# Patient Record
Sex: MALE | Race: ASIAN | NOT HISPANIC OR LATINO | URBAN - METROPOLITAN AREA
[De-identification: names, ages, dates, MRNs, and addresses within clinical notes are randomized per-mention and may not be internally consistent; named-entity substitution may affect disease eponyms.]

---

## 2024-01-09 ENCOUNTER — INPATIENT (INPATIENT)
Facility: HOSPITAL | Age: 74
LOS: 2 days | Discharge: HOME | End: 2024-01-12
Attending: INTERNAL MEDICINE | Admitting: INTERNAL MEDICINE
Payer: MEDICARE

## 2024-01-09 VITALS
WEIGHT: 154.98 LBS | DIASTOLIC BLOOD PRESSURE: 91 MMHG | RESPIRATION RATE: 18 BRPM | HEIGHT: 69 IN | TEMPERATURE: 98 F | HEART RATE: 87 BPM | SYSTOLIC BLOOD PRESSURE: 135 MMHG | OXYGEN SATURATION: 99 %

## 2024-01-09 DIAGNOSIS — Z98.890 OTHER SPECIFIED POSTPROCEDURAL STATES: Chronic | ICD-10-CM

## 2024-01-09 LAB
ALBUMIN SERPL ELPH-MCNC: 3.3 G/DL — SIGNIFICANT CHANGE UP (ref 3.3–5)
ALBUMIN SERPL ELPH-MCNC: 3.3 G/DL — SIGNIFICANT CHANGE UP (ref 3.3–5)
ALP SERPL-CCNC: 130 U/L — HIGH (ref 40–120)
ALP SERPL-CCNC: 130 U/L — HIGH (ref 40–120)
ALT FLD-CCNC: 35 U/L — SIGNIFICANT CHANGE UP (ref 12–78)
ALT FLD-CCNC: 35 U/L — SIGNIFICANT CHANGE UP (ref 12–78)
ANION GAP SERPL CALC-SCNC: 5 MMOL/L — SIGNIFICANT CHANGE UP (ref 5–17)
ANION GAP SERPL CALC-SCNC: 5 MMOL/L — SIGNIFICANT CHANGE UP (ref 5–17)
APPEARANCE UR: CLEAR — SIGNIFICANT CHANGE UP
APPEARANCE UR: CLEAR — SIGNIFICANT CHANGE UP
APTT BLD: 32.1 SEC — SIGNIFICANT CHANGE UP (ref 24.5–35.6)
APTT BLD: 32.1 SEC — SIGNIFICANT CHANGE UP (ref 24.5–35.6)
AST SERPL-CCNC: 21 U/L — SIGNIFICANT CHANGE UP (ref 15–37)
AST SERPL-CCNC: 21 U/L — SIGNIFICANT CHANGE UP (ref 15–37)
BACTERIA # UR AUTO: NEGATIVE /HPF — SIGNIFICANT CHANGE UP
BACTERIA # UR AUTO: NEGATIVE /HPF — SIGNIFICANT CHANGE UP
BASOPHILS # BLD AUTO: 0.02 K/UL — SIGNIFICANT CHANGE UP (ref 0–0.2)
BASOPHILS # BLD AUTO: 0.02 K/UL — SIGNIFICANT CHANGE UP (ref 0–0.2)
BASOPHILS NFR BLD AUTO: 0.3 % — SIGNIFICANT CHANGE UP (ref 0–2)
BASOPHILS NFR BLD AUTO: 0.3 % — SIGNIFICANT CHANGE UP (ref 0–2)
BILIRUB SERPL-MCNC: 0.3 MG/DL — SIGNIFICANT CHANGE UP (ref 0.2–1.2)
BILIRUB SERPL-MCNC: 0.3 MG/DL — SIGNIFICANT CHANGE UP (ref 0.2–1.2)
BILIRUB UR-MCNC: NEGATIVE — SIGNIFICANT CHANGE UP
BILIRUB UR-MCNC: NEGATIVE — SIGNIFICANT CHANGE UP
BLD GP AB SCN SERPL QL: SIGNIFICANT CHANGE UP
BLD GP AB SCN SERPL QL: SIGNIFICANT CHANGE UP
BUN SERPL-MCNC: 18 MG/DL — SIGNIFICANT CHANGE UP (ref 7–23)
BUN SERPL-MCNC: 18 MG/DL — SIGNIFICANT CHANGE UP (ref 7–23)
CALCIUM SERPL-MCNC: 8.7 MG/DL — SIGNIFICANT CHANGE UP (ref 8.5–10.1)
CALCIUM SERPL-MCNC: 8.7 MG/DL — SIGNIFICANT CHANGE UP (ref 8.5–10.1)
CHLORIDE SERPL-SCNC: 107 MMOL/L — SIGNIFICANT CHANGE UP (ref 96–108)
CHLORIDE SERPL-SCNC: 107 MMOL/L — SIGNIFICANT CHANGE UP (ref 96–108)
CO2 SERPL-SCNC: 26 MMOL/L — SIGNIFICANT CHANGE UP (ref 22–31)
CO2 SERPL-SCNC: 26 MMOL/L — SIGNIFICANT CHANGE UP (ref 22–31)
COLOR SPEC: YELLOW — SIGNIFICANT CHANGE UP
COLOR SPEC: YELLOW — SIGNIFICANT CHANGE UP
CREAT SERPL-MCNC: 0.69 MG/DL — SIGNIFICANT CHANGE UP (ref 0.5–1.3)
CREAT SERPL-MCNC: 0.69 MG/DL — SIGNIFICANT CHANGE UP (ref 0.5–1.3)
CRP SERPL-MCNC: <3 MG/L — SIGNIFICANT CHANGE UP
CRP SERPL-MCNC: <3 MG/L — SIGNIFICANT CHANGE UP
DIFF PNL FLD: NEGATIVE — SIGNIFICANT CHANGE UP
DIFF PNL FLD: NEGATIVE — SIGNIFICANT CHANGE UP
EGFR: 97 ML/MIN/1.73M2 — SIGNIFICANT CHANGE UP
EGFR: 97 ML/MIN/1.73M2 — SIGNIFICANT CHANGE UP
EOSINOPHIL # BLD AUTO: 0.14 K/UL — SIGNIFICANT CHANGE UP (ref 0–0.5)
EOSINOPHIL # BLD AUTO: 0.14 K/UL — SIGNIFICANT CHANGE UP (ref 0–0.5)
EOSINOPHIL NFR BLD AUTO: 1.8 % — SIGNIFICANT CHANGE UP (ref 0–6)
EOSINOPHIL NFR BLD AUTO: 1.8 % — SIGNIFICANT CHANGE UP (ref 0–6)
EPI CELLS # UR: PRESENT
EPI CELLS # UR: PRESENT
ERYTHROCYTE [SEDIMENTATION RATE] IN BLOOD: 21 MM/HR — HIGH (ref 0–20)
ERYTHROCYTE [SEDIMENTATION RATE] IN BLOOD: 21 MM/HR — HIGH (ref 0–20)
GLUCOSE SERPL-MCNC: 173 MG/DL — HIGH (ref 70–99)
GLUCOSE SERPL-MCNC: 173 MG/DL — HIGH (ref 70–99)
GLUCOSE UR QL: NEGATIVE MG/DL — SIGNIFICANT CHANGE UP
GLUCOSE UR QL: NEGATIVE MG/DL — SIGNIFICANT CHANGE UP
HCT VFR BLD CALC: 39.1 % — SIGNIFICANT CHANGE UP (ref 39–50)
HCT VFR BLD CALC: 39.1 % — SIGNIFICANT CHANGE UP (ref 39–50)
HGB BLD-MCNC: 13.2 G/DL — SIGNIFICANT CHANGE UP (ref 13–17)
HGB BLD-MCNC: 13.2 G/DL — SIGNIFICANT CHANGE UP (ref 13–17)
IMM GRANULOCYTES NFR BLD AUTO: 0.3 % — SIGNIFICANT CHANGE UP (ref 0–0.9)
IMM GRANULOCYTES NFR BLD AUTO: 0.3 % — SIGNIFICANT CHANGE UP (ref 0–0.9)
INR BLD: 1.13 RATIO — SIGNIFICANT CHANGE UP (ref 0.85–1.18)
INR BLD: 1.13 RATIO — SIGNIFICANT CHANGE UP (ref 0.85–1.18)
KETONES UR-MCNC: NEGATIVE MG/DL — SIGNIFICANT CHANGE UP
KETONES UR-MCNC: NEGATIVE MG/DL — SIGNIFICANT CHANGE UP
LEUKOCYTE ESTERASE UR-ACNC: NEGATIVE — SIGNIFICANT CHANGE UP
LEUKOCYTE ESTERASE UR-ACNC: NEGATIVE — SIGNIFICANT CHANGE UP
LYMPHOCYTES # BLD AUTO: 2.74 K/UL — SIGNIFICANT CHANGE UP (ref 1–3.3)
LYMPHOCYTES # BLD AUTO: 2.74 K/UL — SIGNIFICANT CHANGE UP (ref 1–3.3)
LYMPHOCYTES # BLD AUTO: 35.7 % — SIGNIFICANT CHANGE UP (ref 13–44)
LYMPHOCYTES # BLD AUTO: 35.7 % — SIGNIFICANT CHANGE UP (ref 13–44)
MCHC RBC-ENTMCNC: 28 PG — SIGNIFICANT CHANGE UP (ref 27–34)
MCHC RBC-ENTMCNC: 28 PG — SIGNIFICANT CHANGE UP (ref 27–34)
MCHC RBC-ENTMCNC: 33.8 G/DL — SIGNIFICANT CHANGE UP (ref 32–36)
MCHC RBC-ENTMCNC: 33.8 G/DL — SIGNIFICANT CHANGE UP (ref 32–36)
MCV RBC AUTO: 83 FL — SIGNIFICANT CHANGE UP (ref 80–100)
MCV RBC AUTO: 83 FL — SIGNIFICANT CHANGE UP (ref 80–100)
MONOCYTES # BLD AUTO: 0.62 K/UL — SIGNIFICANT CHANGE UP (ref 0–0.9)
MONOCYTES # BLD AUTO: 0.62 K/UL — SIGNIFICANT CHANGE UP (ref 0–0.9)
MONOCYTES NFR BLD AUTO: 8.1 % — SIGNIFICANT CHANGE UP (ref 2–14)
MONOCYTES NFR BLD AUTO: 8.1 % — SIGNIFICANT CHANGE UP (ref 2–14)
NEUTROPHILS # BLD AUTO: 4.14 K/UL — SIGNIFICANT CHANGE UP (ref 1.8–7.4)
NEUTROPHILS # BLD AUTO: 4.14 K/UL — SIGNIFICANT CHANGE UP (ref 1.8–7.4)
NEUTROPHILS NFR BLD AUTO: 53.8 % — SIGNIFICANT CHANGE UP (ref 43–77)
NEUTROPHILS NFR BLD AUTO: 53.8 % — SIGNIFICANT CHANGE UP (ref 43–77)
NITRITE UR-MCNC: NEGATIVE — SIGNIFICANT CHANGE UP
NITRITE UR-MCNC: NEGATIVE — SIGNIFICANT CHANGE UP
NRBC # BLD: 0 /100 WBCS — SIGNIFICANT CHANGE UP (ref 0–0)
NRBC # BLD: 0 /100 WBCS — SIGNIFICANT CHANGE UP (ref 0–0)
PH UR: 5.5 — SIGNIFICANT CHANGE UP (ref 5–8)
PH UR: 5.5 — SIGNIFICANT CHANGE UP (ref 5–8)
PLATELET # BLD AUTO: 258 K/UL — SIGNIFICANT CHANGE UP (ref 150–400)
PLATELET # BLD AUTO: 258 K/UL — SIGNIFICANT CHANGE UP (ref 150–400)
POTASSIUM SERPL-MCNC: 4.2 MMOL/L — SIGNIFICANT CHANGE UP (ref 3.5–5.3)
POTASSIUM SERPL-MCNC: 4.2 MMOL/L — SIGNIFICANT CHANGE UP (ref 3.5–5.3)
POTASSIUM SERPL-SCNC: 4.2 MMOL/L — SIGNIFICANT CHANGE UP (ref 3.5–5.3)
POTASSIUM SERPL-SCNC: 4.2 MMOL/L — SIGNIFICANT CHANGE UP (ref 3.5–5.3)
PROT SERPL-MCNC: 7.7 GM/DL — SIGNIFICANT CHANGE UP (ref 6–8.3)
PROT SERPL-MCNC: 7.7 GM/DL — SIGNIFICANT CHANGE UP (ref 6–8.3)
PROT UR-MCNC: NEGATIVE MG/DL — SIGNIFICANT CHANGE UP
PROT UR-MCNC: NEGATIVE MG/DL — SIGNIFICANT CHANGE UP
PROTHROM AB SERPL-ACNC: 13.4 SEC — HIGH (ref 9.5–13)
PROTHROM AB SERPL-ACNC: 13.4 SEC — HIGH (ref 9.5–13)
RAPID RVP RESULT: SIGNIFICANT CHANGE UP
RAPID RVP RESULT: SIGNIFICANT CHANGE UP
RBC # BLD: 4.71 M/UL — SIGNIFICANT CHANGE UP (ref 4.2–5.8)
RBC # BLD: 4.71 M/UL — SIGNIFICANT CHANGE UP (ref 4.2–5.8)
RBC # FLD: 12.4 % — SIGNIFICANT CHANGE UP (ref 10.3–14.5)
RBC # FLD: 12.4 % — SIGNIFICANT CHANGE UP (ref 10.3–14.5)
RBC CASTS # UR COMP ASSIST: SIGNIFICANT CHANGE UP /HPF (ref 0–4)
RBC CASTS # UR COMP ASSIST: SIGNIFICANT CHANGE UP /HPF (ref 0–4)
SARS-COV-2 RNA SPEC QL NAA+PROBE: SIGNIFICANT CHANGE UP
SARS-COV-2 RNA SPEC QL NAA+PROBE: SIGNIFICANT CHANGE UP
SODIUM SERPL-SCNC: 138 MMOL/L — SIGNIFICANT CHANGE UP (ref 135–145)
SODIUM SERPL-SCNC: 138 MMOL/L — SIGNIFICANT CHANGE UP (ref 135–145)
SP GR SPEC: 1.03 — SIGNIFICANT CHANGE UP (ref 1–1.03)
SP GR SPEC: 1.03 — SIGNIFICANT CHANGE UP (ref 1–1.03)
TROPONIN I, HIGH SENSITIVITY RESULT: 10.2 NG/L — SIGNIFICANT CHANGE UP
TROPONIN I, HIGH SENSITIVITY RESULT: 10.2 NG/L — SIGNIFICANT CHANGE UP
UROBILINOGEN FLD QL: 0.2 MG/DL — SIGNIFICANT CHANGE UP (ref 0.2–1)
UROBILINOGEN FLD QL: 0.2 MG/DL — SIGNIFICANT CHANGE UP (ref 0.2–1)
WBC # BLD: 7.68 K/UL — SIGNIFICANT CHANGE UP (ref 3.8–10.5)
WBC # BLD: 7.68 K/UL — SIGNIFICANT CHANGE UP (ref 3.8–10.5)
WBC # FLD AUTO: 7.68 K/UL — SIGNIFICANT CHANGE UP (ref 3.8–10.5)
WBC # FLD AUTO: 7.68 K/UL — SIGNIFICANT CHANGE UP (ref 3.8–10.5)
WBC UR QL: SIGNIFICANT CHANGE UP /HPF (ref 0–5)
WBC UR QL: SIGNIFICANT CHANGE UP /HPF (ref 0–5)

## 2024-01-09 PROCEDURE — 99285 EMERGENCY DEPT VISIT HI MDM: CPT | Mod: 25

## 2024-01-09 PROCEDURE — 72131 CT LUMBAR SPINE W/O DYE: CPT | Mod: 26,ME

## 2024-01-09 PROCEDURE — 99223 1ST HOSP IP/OBS HIGH 75: CPT

## 2024-01-09 PROCEDURE — 74177 CT ABD & PELVIS W/CONTRAST: CPT | Mod: 26,ME

## 2024-01-09 PROCEDURE — 72157 MRI CHEST SPINE W/O & W/DYE: CPT | Mod: 26,MF

## 2024-01-09 PROCEDURE — G1004: CPT

## 2024-01-09 PROCEDURE — 71275 CT ANGIOGRAPHY CHEST: CPT | Mod: 26,MA

## 2024-01-09 PROCEDURE — 93010 ELECTROCARDIOGRAM REPORT: CPT

## 2024-01-09 PROCEDURE — 72156 MRI NECK SPINE W/O & W/DYE: CPT | Mod: 26,MB

## 2024-01-09 PROCEDURE — 72158 MRI LUMBAR SPINE W/O & W/DYE: CPT | Mod: 26,ME

## 2024-01-09 PROCEDURE — 71045 X-RAY EXAM CHEST 1 VIEW: CPT | Mod: 26

## 2024-01-09 RX ORDER — SODIUM CHLORIDE 9 MG/ML
1000 INJECTION INTRAMUSCULAR; INTRAVENOUS; SUBCUTANEOUS ONCE
Refills: 0 | Status: COMPLETED | OUTPATIENT
Start: 2024-01-09 | End: 2024-01-09

## 2024-01-09 RX ORDER — LIDOCAINE 4 G/100G
1 CREAM TOPICAL ONCE
Refills: 0 | Status: COMPLETED | OUTPATIENT
Start: 2024-01-09 | End: 2024-01-09

## 2024-01-09 RX ORDER — KETOROLAC TROMETHAMINE 30 MG/ML
15 SYRINGE (ML) INJECTION ONCE
Refills: 0 | Status: DISCONTINUED | OUTPATIENT
Start: 2024-01-09 | End: 2024-01-09

## 2024-01-09 RX ORDER — PIPERACILLIN AND TAZOBACTAM 4; .5 G/20ML; G/20ML
3.38 INJECTION, POWDER, LYOPHILIZED, FOR SOLUTION INTRAVENOUS EVERY 8 HOURS
Refills: 0 | Status: DISCONTINUED | OUTPATIENT
Start: 2024-01-10 | End: 2024-01-10

## 2024-01-09 RX ORDER — CYCLOBENZAPRINE HYDROCHLORIDE 10 MG/1
10 TABLET, FILM COATED ORAL ONCE
Refills: 0 | Status: COMPLETED | OUTPATIENT
Start: 2024-01-09 | End: 2024-01-09

## 2024-01-09 RX ORDER — PIPERACILLIN AND TAZOBACTAM 4; .5 G/20ML; G/20ML
3.38 INJECTION, POWDER, LYOPHILIZED, FOR SOLUTION INTRAVENOUS ONCE
Refills: 0 | Status: COMPLETED | OUTPATIENT
Start: 2024-01-09 | End: 2024-01-09

## 2024-01-09 RX ORDER — TAMSULOSIN HYDROCHLORIDE 0.4 MG/1
1 CAPSULE ORAL
Refills: 0 | DISCHARGE

## 2024-01-09 RX ORDER — PIPERACILLIN AND TAZOBACTAM 4; .5 G/20ML; G/20ML
3.38 INJECTION, POWDER, LYOPHILIZED, FOR SOLUTION INTRAVENOUS ONCE
Refills: 0 | Status: DISCONTINUED | OUTPATIENT
Start: 2024-01-09 | End: 2024-01-09

## 2024-01-09 RX ORDER — ACETAMINOPHEN 500 MG
650 TABLET ORAL EVERY 6 HOURS
Refills: 0 | Status: DISCONTINUED | OUTPATIENT
Start: 2024-01-09 | End: 2024-01-12

## 2024-01-09 RX ORDER — VANCOMYCIN HCL 1 G
1000 VIAL (EA) INTRAVENOUS ONCE
Refills: 0 | Status: COMPLETED | OUTPATIENT
Start: 2024-01-09 | End: 2024-01-09

## 2024-01-09 RX ORDER — TAMSULOSIN HYDROCHLORIDE 0.4 MG/1
0.4 CAPSULE ORAL AT BEDTIME
Refills: 0 | Status: DISCONTINUED | OUTPATIENT
Start: 2024-01-09 | End: 2024-01-12

## 2024-01-09 RX ADMIN — SODIUM CHLORIDE 1000 MILLILITER(S): 9 INJECTION INTRAMUSCULAR; INTRAVENOUS; SUBCUTANEOUS at 15:54

## 2024-01-09 RX ADMIN — Medication 15 MILLIGRAM(S): at 15:54

## 2024-01-09 RX ADMIN — LIDOCAINE 1 PATCH: 4 CREAM TOPICAL at 05:00

## 2024-01-09 RX ADMIN — CYCLOBENZAPRINE HYDROCHLORIDE 10 MILLIGRAM(S): 10 TABLET, FILM COATED ORAL at 13:36

## 2024-01-09 RX ADMIN — CYCLOBENZAPRINE HYDROCHLORIDE 10 MILLIGRAM(S): 10 TABLET, FILM COATED ORAL at 05:01

## 2024-01-09 RX ADMIN — SODIUM CHLORIDE 1000 MILLILITER(S): 9 INJECTION INTRAMUSCULAR; INTRAVENOUS; SUBCUTANEOUS at 05:23

## 2024-01-09 RX ADMIN — Medication 250 MILLIGRAM(S): at 15:59

## 2024-01-09 RX ADMIN — PIPERACILLIN AND TAZOBACTAM 25 GRAM(S): 4; .5 INJECTION, POWDER, LYOPHILIZED, FOR SOLUTION INTRAVENOUS at 22:38

## 2024-01-09 RX ADMIN — PIPERACILLIN AND TAZOBACTAM 200 GRAM(S): 4; .5 INJECTION, POWDER, LYOPHILIZED, FOR SOLUTION INTRAVENOUS at 15:26

## 2024-01-09 RX ADMIN — TAMSULOSIN HYDROCHLORIDE 0.4 MILLIGRAM(S): 0.4 CAPSULE ORAL at 23:37

## 2024-01-09 RX ADMIN — Medication 15 MILLIGRAM(S): at 13:37

## 2024-01-09 RX ADMIN — Medication 15 MILLIGRAM(S): at 05:00

## 2024-01-09 NOTE — CONSULT NOTE ADULT - SUBJECTIVE AND OBJECTIVE BOX
Patient seen and examined at bedside. Patient complains of ongoing/worsening lower back pain for past 2 months, since kyphoplasty of L2 2 months ago in [place]. Patient is a 73M with history of BPH and recent kyphoplasty. Additionally, patient is complaining of lower abdominal pain, and URI symptoms, with chest pain, associated with cough. Admits to fever 1 day ago.       PMH/PSH:PAST MEDICAL & SURGICAL HISTORY:  BPH (benign prostatic hyperplasia)      Previous back surgery          Allergies:  No Known Allergies      Medications:      REVIEW OF SYSTEMS:  All other review of systems is negative unless indicated above.        Relevant Social History:  Denies ETOH or tobacco history    Physical Exam:    Vital Signs:  Vital Signs Last 24 Hrs  T(C): 36.7 (2024 07:49), Max: 36.8 (2024 00:08)  T(F): 98 (2024 07:49), Max: 98.3 (2024 00:08)  HR: 66 (2024 07:49) (66 - 87)  BP: 166/96 (2024 07:49) (135/91 - 166/96)  RR: 18 (2024 07:49) (17 - 18)  SpO2: 100% (2024 07:49) (98% - 100%)    Parameters below as of 2024 07:49  Patient On (Oxygen Delivery Method): room air      Daily Height in cm: 175.26 (2024 00:08)    Daily     Constitutional: NAD  HEENT: NC/AT, PERRL, EOMI, anicteric sclera, no nasal discharge  Neck: supple; no JVD or thyromegaly  Respiratory: Good inspiratory effort, no respiratory distress  Cardiac: +S1/S2  Gastrointestinal: soft, NT/ND; no rebound or guarding   Extremities: No clubbing or cyanosis; no peripheral edema  Musculoskeletal:  no joint swelling, tenderness or erythema  Vascular: 2+ radial, femoral, DP/PT pulses B/L  Skin: warm, dry and intact; no rashes, wounds, or scars  Neurologic: AAOx3    Laboratory:                          13.2   7.68  )-----------( 258      ( 2024 04:16 )             39.1     01-09    138  |  107  |  18  ----------------------------<  173<H>  4.2   |  26  |  0.69    Ca    8.7      2024 04:16    TPro  7.7  /  Alb  3.3  /  TBili  0.3  /  DBili  x   /  AST  21  /  ALT  35  /  AlkPhos  130<H>      LIVER FUNCTIONS - ( 2024 04:16 )  Alb: 3.3 g/dL / Pro: 7.7 gm/dL / ALK PHOS: 130 U/L / ALT: 35 U/L / AST: 21 U/L / GGT: x           PT/INR - ( 2024 06:43 )   PT: 13.4 sec;   INR: 1.13 ratio         PTT - ( 2024 06:43 )  PTT:32.1 sec  Urinalysis Basic - ( 2024 05:05 )    Color: Yellow / Appearance: Clear / S.029 / pH: x  Gluc: x / Ketone: Negative mg/dL  / Bili: Negative / Urobili: 0.2 mg/dL   Blood: x / Protein: Negative mg/dL / Nitrite: Negative   Leuk Esterase: Negative / RBC: 0-2 /HPF / WBC 0-2 /HPF   Sq Epi: x / Non Sq Epi: x / Bacteria: Negative /HPF        Imaging:    < from: CT Abdomen and Pelvis w/ IV Cont (24 @ 05:24) >    ACC: [id number] EXAM:  CT LUMBAR SPINE   ORDERED BY: FRANCISCO ACEVEDO     ACC: [id number] EXAM:  CT ABDOMEN AND PELVIS IC   ORDERED BY: FRANCISCO ACEVEDO     PROCEDURE DATE:  2024          INTERPRETATION:  CLINICAL INFORMATION: Abdominal pain.Lower back pain.    COMPARISON: None.    CONTRAST/COMPLICATIONS:  IV Contrast: Omnipaque 350 (accession [id number]), NONE (accession   [id number])  70 cc administered   1 cc discarded  Oral Contrast: NONE  Complications: None reported at time of study completion    PROCEDURE:  CT of the lumbar spine was performed before intravenous contrast.  CT of the Abdomen and Pelvis was performed after intravenous contrast.  Sagittal and coronal reformats were performed.    FINDINGS:  ABDOMEN/PELVIS:  LOWER CHEST: Within normal limits.    LIVER: Within normal limits.  BILE DUCTS: Normal caliber.  GALLBLADDER: Within normal limits.  SPLEEN: Within normal limits.  PANCREAS: Within normal limits.  ADRENALS: Within normal limits.  KIDNEYS/URETERS: Punctate nonobstructing right intrarenal calculus.   Otherwise within normal limits. No hydronephrosis.    BLADDER: Minimally distended.  REPRODUCTIVE ORGANS: Prostate is enlarged.    BOWEL: No bowel obstruction. Appendix is normal. Scattered colonic   diverticula.  PERITONEUM: No ascites.  VESSELS: Atherosclerotic changes.  RETROPERITONEUM/LYMPH NODES: No lymphadenopathy.  ABDOMINAL WALL: Tiny fat-containing left inguinal hernia.  BONES: Degenerative changes.    LUMBAR SPINE:  ALIGNMENT:   Minimal retrolisthesis L2 on L3 and L5 on likely   degenerative.  BONES:  Kyphoplasty material in the anterior L2 vertebral body, with   associated minimal areas of linear lucency (for example 6:18), which may   reflect recent acute/subacute fracture. Cortical loss at the L1 inferior   and L2 superior endplates (for example 6:26), likely reflect degenerative   change, although infectious process is not entirely excluded. There is   mild anterior wedging at L2 with approximately 10-20% loss of vertebral   body height anteriorly. Minimal central compression deformity at the L3   superior endplate with minimal sclerosis (6:26), no definite acute   fracture lucency. No areas of abnormal osseous retropulsion. The   visualized sacroiliac joints are patent, with mild degenerative changes   bilaterally.  DISCS AND FACET JOINTS:  There is loss of vertebral body height at L1-2   with small air in the disc space. Additional moderate loss of   intervertebral disc height at L5-S1 with tiny air in the disc space.  EXTRASPINAL: There is curvilinear hyperdensity extending from the left   anterior L2 vertebral body towards the IVC (2:48-54), which may reflect   kyphoplasty material within a lumbar vein.    L1-2: Mild disc bulge, mild right facet arthropathy. No significant   spinal canal stenosis. Minimal bilateral neural foraminal narrowing.    L1-2: Mild disc bulge, mild facet arthropathy bilaterally. Minimal spinal   canal stenosis, mild left greater than right neural foraminal narrowing.    L3-4: Minimal disc bulge, right greater than left facet arthropathy.   Minimal spinal canal stenosis, mild bilateral neural foraminal narrowing.    L4-5: Circumferential disc bulge, moderate bilateral facet arthropathy   and mild thickening of ligamentum flavum. Mild to moderate spinal canal   stenosis. Moderate bilateral neural foraminal narrowing.    L5-S1: Disc bulge and minimal disc extrusion extending 3 to 4 mm below   the level of the disc space. Mild bilateral facet arthropathy. Minimal   spinal canal stenosis. Mild bilateral neural foraminal narrowing.      IMPRESSION:  CT abdomen/pelvis:  No evidence of acute intra-abdominal abnormality.    Lumbar spine:  Status post L2 vertebral body kyphoplasty. Curvilinear hyperdense   material extending from the left anterior aspect of the vertebral body to   the inferior vena cava is concerning for kyphoplasty material within a   lumbar vein. Correlation with prior imaging could be helpful, if   available.    There are erosive changes centered around the L1-2 disc space, which may   reflect degenerative change, although infection is not entirely excluded.   If not contraindicated, a contrast-enhanced MRI may provide further   assessment.    Minimal central compression deformity of the L4 superior endplate may   reflect degenerative change or possible subacute fracture. No areas of   osseous retropulsion are seen in the lumbar spine.    Degenerative changes, as above.   Patient seen and examined at bedside. Patient complains of ongoing/worsening lower back pain for past 2 months, since kyphoplasty of L2 2 months ago in [place]. Patient is a 73M with history of BPH and recent kyphoplasty. Additionally, patient is complaining of lower abdominal pain, and URI symptoms, with chest pain, associated with cough. Admits to fever 1 day ago.       PMH/PSH:PAST MEDICAL & SURGICAL HISTORY:  BPH (benign prostatic hyperplasia)      Previous back surgery          Allergies:  No Known Allergies      Medications:      REVIEW OF SYSTEMS:  All other review of systems is negative unless indicated above.        Relevant Social History:  Denies ETOH or tobacco history    Physical Exam:    Vital Signs:  Vital Signs Last 24 Hrs  T(C): 36.7 (2024 07:49), Max: 36.8 (2024 00:08)  T(F): 98 (2024 07:49), Max: 98.3 (2024 00:08)  HR: 66 (2024 07:49) (66 - 87)  BP: 166/96 (2024 07:49) (135/91 - 166/96)  RR: 18 (2024 07:49) (17 - 18)  SpO2: 100% (2024 07:49) (98% - 100%)    Parameters below as of 2024 07:49  Patient On (Oxygen Delivery Method): room air      Daily Height in cm: 175.26 (2024 00:08)    Daily     Constitutional: NAD  HEENT: NC/AT, PERRL, EOMI, anicteric sclera, no nasal discharge  Neck: supple; no JVD or thyromegaly  Respiratory: Good inspiratory effort, no respiratory distress  Cardiac: +S1/S2  Gastrointestinal: soft, NT/ND; no rebound or guarding   Extremities: No clubbing or cyanosis; no peripheral edema  Musculoskeletal:  no joint swelling, tenderness or erythema  Vascular: 2+ radial, femoral, DP/PT pulses B/L  Skin: warm, dry and intact; no rashes, wounds, or scars  Neurologic: AAOx3    Laboratory:                          13.2   7.68  )-----------( 258      ( 2024 04:16 )             39.1     01-09    138  |  107  |  18  ----------------------------<  173<H>  4.2   |  26  |  0.69    Ca    8.7      2024 04:16    TPro  7.7  /  Alb  3.3  /  TBili  0.3  /  DBili  x   /  AST  21  /  ALT  35  /  AlkPhos  130<H>      LIVER FUNCTIONS - ( 2024 04:16 )  Alb: 3.3 g/dL / Pro: 7.7 gm/dL / ALK PHOS: 130 U/L / ALT: 35 U/L / AST: 21 U/L / GGT: x           PT/INR - ( 2024 06:43 )   PT: 13.4 sec;   INR: 1.13 ratio         PTT - ( 2024 06:43 )  PTT:32.1 sec  Urinalysis Basic - ( 2024 05:05 )    Color: Yellow / Appearance: Clear / S.029 / pH: x  Gluc: x / Ketone: Negative mg/dL  / Bili: Negative / Urobili: 0.2 mg/dL   Blood: x / Protein: Negative mg/dL / Nitrite: Negative   Leuk Esterase: Negative / RBC: 0-2 /HPF / WBC 0-2 /HPF   Sq Epi: x / Non Sq Epi: x / Bacteria: Negative /HPF        Imaging:    < from: CT Abdomen and Pelvis w/ IV Cont (24 @ 05:24) >    ACC: [id number] EXAM:  CT LUMBAR SPINE   ORDERED BY: FRANCISCO ACEVEDO     ACC: [id number] EXAM:  CT ABDOMEN AND PELVIS IC   ORDERED BY: FRANCISCO ACEVEDO     PROCEDURE DATE:  2024          INTERPRETATION:  CLINICAL INFORMATION: Abdominal pain.Lower back pain.    COMPARISON: None.    CONTRAST/COMPLICATIONS:  IV Contrast: Omnipaque 350 (accession [id number]), NONE (accession   [id number])  70 cc administered   1 cc discarded  Oral Contrast: NONE  Complications: None reported at time of study completion    PROCEDURE:  CT of the lumbar spine was performed before intravenous contrast.  CT of the Abdomen and Pelvis was performed after intravenous contrast.  Sagittal and coronal reformats were performed.    FINDINGS:  ABDOMEN/PELVIS:  LOWER CHEST: Within normal limits.    LIVER: Within normal limits.  BILE DUCTS: Normal caliber.  GALLBLADDER: Within normal limits.  SPLEEN: Within normal limits.  PANCREAS: Within normal limits.  ADRENALS: Within normal limits.  KIDNEYS/URETERS: Punctate nonobstructing right intrarenal calculus.   Otherwise within normal limits. No hydronephrosis.    BLADDER: Minimally distended.  REPRODUCTIVE ORGANS: Prostate is enlarged.    BOWEL: No bowel obstruction. Appendix is normal. Scattered colonic   diverticula.  PERITONEUM: No ascites.  VESSELS: Atherosclerotic changes.  RETROPERITONEUM/LYMPH NODES: No lymphadenopathy.  ABDOMINAL WALL: Tiny fat-containing left inguinal hernia.  BONES: Degenerative changes.    LUMBAR SPINE:  ALIGNMENT:   Minimal retrolisthesis L2 on L3 and L5 on likely   degenerative.  BONES:  Kyphoplasty material in the anterior L2 vertebral body, with   associated minimal areas of linear lucency (for example 6:18), which may   reflect recent acute/subacute fracture. Cortical loss at the L1 inferior   and L2 superior endplates (for example 6:26), likely reflect degenerative   change, although infectious process is not entirely excluded. There is   mild anterior wedging at L2 with approximately 10-20% loss of vertebral   body height anteriorly. Minimal central compression deformity at the L3   superior endplate with minimal sclerosis (6:26), no definite acute   fracture lucency. No areas of abnormal osseous retropulsion. The   visualized sacroiliac joints are patent, with mild degenerative changes   bilaterally.  DISCS AND FACET JOINTS:  There is loss of vertebral body height at L1-2   with small air in the disc space. Additional moderate loss of   intervertebral disc height at L5-S1 with tiny air in the disc space.  EXTRASPINAL: There is curvilinear hyperdensity extending from the left   anterior L2 vertebral body towards the IVC (2:48-54), which may reflect   kyphoplasty material within a lumbar vein.    L1-2: Mild disc bulge, mild right facet arthropathy. No significant   spinal canal stenosis. Minimal bilateral neural foraminal narrowing.    L1-2: Mild disc bulge, mild facet arthropathy bilaterally. Minimal spinal   canal stenosis, mild left greater than right neural foraminal narrowing.    L3-4: Minimal disc bulge, right greater than left facet arthropathy.   Minimal spinal canal stenosis, mild bilateral neural foraminal narrowing.    L4-5: Circumferential disc bulge, moderate bilateral facet arthropathy   and mild thickening of ligamentum flavum. Mild to moderate spinal canal   stenosis. Moderate bilateral neural foraminal narrowing.    L5-S1: Disc bulge and minimal disc extrusion extending 3 to 4 mm below   the level of the disc space. Mild bilateral facet arthropathy. Minimal   spinal canal stenosis. Mild bilateral neural foraminal narrowing.      IMPRESSION:  CT abdomen/pelvis:  No evidence of acute intra-abdominal abnormality.    Lumbar spine:  Status post L2 vertebral body kyphoplasty. Curvilinear hyperdense   material extending from the left anterior aspect of the vertebral body to   the inferior vena cava is concerning for kyphoplasty material within a   lumbar vein. Correlation with prior imaging could be helpful, if   available.    There are erosive changes centered around the L1-2 disc space, which may   reflect degenerative change, although infection is not entirely excluded.   If not contraindicated, a contrast-enhanced MRI may provide further   assessment.    Minimal central compression deformity of the L4 superior endplate may   reflect degenerative change or possible subacute fracture. No areas of   osseous retropulsion are seen in the lumbar spine.    Degenerative changes, as above.

## 2024-01-09 NOTE — ED PROVIDER NOTE - PHYSICAL EXAMINATION
General: well appearing in NAD, axoxo3, speaking full sentences  HEENT: NC/AT, MMM, PERRL  Cards: RRR no m/r/g  Pulm: CTAB no w/r/r  Abd: soft, nd/nt no rebound or guarding  Ext: no LE edema, ttp, deformities, no c/t/l spine midline ttp  Neuro: General: well appearing in NAD, axoxo3, speaking full sentences  HEENT: NC/AT, MMM, PERRL  Cards: RRR no m/r/g  Pulm: CTAB no w/r/r  Abd: soft, nd/nt no rebound or guarding  Ext: no LE edema, ttp, deformities, no c/t/l spine midline ttp  Neuro: AxOx3, speaking full sentences, face symmetric, ambulatory, moving all extremities, 5/5 strength UE/LE b/l, no saddle anesthesia

## 2024-01-09 NOTE — CONSULT NOTE ADULT - NS ATTEND AMEND GEN_ALL_CORE FT
Patient with history of kyphoplasty in lumbar spine 3 months ago in [place], [place].   Postprocedural course complicated by infection and treatment.   Patient with ongoing low back pain.   Physical exam as above.   Imaging with findings of kyphoplasty cement embolization into a lumbar vein. No evidence of cement pulmonary embolism.  Likely occurred at time of procedure.   No acute intervention is needed. Patient with history of kyphoplasty in lumbar spine 3 months ago in [place], [place].   Postprocedural course complicated by infection and treatment.   Patient with ongoing low back pain.   Physical exam as above.   Imaging with findings of kyphoplasty cement embolization into a lumbar vein. No evidence of cement pulmonary embolism.  Likely occurred at time of procedure.   No acute intervention is needed.

## 2024-01-09 NOTE — ED PROVIDER NOTE - PROGRESS NOTE DETAILS
MD Romi: CT shows possible erosion of postsurgical material into IVC. Pt remains HD stable. Vascular pa aware. MD Romi: ortho aware of pt given hx osteo at surgical site and errosive changes that are inflammatory vs infectious. Patient signed out to me pending MRI and ortho/vascular recommendations:  Vascular recommended CTA to r/o cement PE which is negative  MR with discitis, likely chronic per ortho, recommend medicine admission for iv abx  Jovany Toney DO

## 2024-01-09 NOTE — ED ADULT NURSE REASSESSMENT NOTE - NS ED NURSE REASSESS COMMENT FT1
pt is A&Ox3. vitals obtained and documented. pt is in no acute distress at this time. orders reviewed and completed. pt safety maintained. pt sent for MRI. MRI checklist completed and placed in chart.

## 2024-01-09 NOTE — H&P ADULT - NSHPLABSRESULTS_GEN_ALL_CORE
VITALS:  Vital Signs Last 24 Hrs  T(C): 36.7 (2024 16:12), Max: 36.8 (2024 00:08)  T(F): 98 (2024 16:12), Max: 98.3 (2024 00:08)  HR: 73 (2024 16:12) (66 - 87)  BP: 142/79 (2024 16:12) (135/75 - 166/96)  BP(mean): --  RR: 18 (2024 16:12) (16 - 18)  SpO2: 100% (2024 16:12) (98% - 100%)    Parameters below as of 2024 16:12  Patient On (Oxygen Delivery Method): room air     Daily Height in cm: 175.26 (2024 00:08)    Daily   CAPILLARY BLOOD GLUCOSE        I&O's Summary      LABS:                        13.2   7.68  )-----------( 258      ( 2024 04:16 )             39.1         138  |  107  |  18  ----------------------------<  173<H>  4.2   |  26  |  0.69    Ca    8.7      2024 04:16    TPro  7.7  /  Alb  3.3  /  TBili  0.3  /  DBili  x   /  AST  21  /  ALT  35  /  AlkPhos  130<H>      PT/INR - ( 2024 06:43 )   PT: 13.4 sec;   INR: 1.13 ratio         PTT - ( 2024 06:43 )  PTT:32.1 sec  LIVER FUNCTIONS - ( 2024 04:16 )  Alb: 3.3 g/dL / Pro: 7.7 gm/dL / ALK PHOS: 130 U/L / ALT: 35 U/L / AST: 21 U/L / GGT: x           Urinalysis Basic - ( 2024 05:05 )    Color: Yellow / Appearance: Clear / S.029 / pH: x  Gluc: x / Ketone: Negative mg/dL  / Bili: Negative / Urobili: 0.2 mg/dL   Blood: x / Protein: Negative mg/dL / Nitrite: Negative   Leuk Esterase: Negative / RBC: 0-2 /HPF / WBC 0-2 /HPF   Sq Epi: x / Non Sq Epi: x / Bacteria: Negative /HPF              MEDICATIONS:  acetaminophen     Tablet .. 650 milliGRAM(s) Oral every 6 hours PRN  oxycodone    5 mG/acetaminophen 325 mG 2 Tablet(s) Oral every 6 hours PRN  tamsulosin 0.4 milliGRAM(s) Oral at bedtime    < from: CT Abdomen and Pelvis w/ IV Cont (24 @ 05:24) >    IMPRESSION:  CT abdomen/pelvis:  No evidence of acute intra-abdominal abnormality.    Lumbar spine:  Status post L2 vertebral body kyphoplasty. Curvilinear hyperdense   material extending from the left anterior aspect of the vertebral body to   the inferior vena cava is concerning for kyphoplasty material within a   lumbar vein. Correlation with prior imaging could be helpful, if   available.    There are erosive changes centered around the L1-2 disc space, which may   reflect degenerative change, although infection is not entirely excluded.   If not contraindicated, a contrast-enhanced MRI may provide further   assessment.    Minimal central compression deformity of the L4 superior endplate may   reflect degenerative change or possible subacute fracture. No areas of   osseous retropulsion are seen in the lumbar spine.    Degenerative changes, as above.        < end of copied text >    < from: CT Angio Chest PE Protocol w/ IV Cont (24 @ 11:49) >    No evidence of pulmonary emboli or other acute finding      < end of copied text >

## 2024-01-09 NOTE — CONSULT NOTE ADULT - ASSESSMENT
73 M s/p L2 kyphoplasty 2 months ago, now with CT noted curvilinear hyperdensity extending from the left anterior L2 vertebral body towards the IVC (2:48-54), which may reflect kyphoplasty material within a lumbar vein    Will follow up MRI to further assess  Discussed with Dr. Brenner 73 M s/p L2 kyphoplasty 2 months ago, now with CT noted curvilinear hyperdensity extending from the left anterior L2 vertebral body towards the IVC (2:48-54), which may reflect kyphoplasty material within a lumbar vein    Recommend CTA chest to r/o cement PE  Will follow up MRI  Discussed with Dr. Brenner

## 2024-01-09 NOTE — ED ADULT NURSE NOTE - NSFALLUNIVINTERV_ED_ALL_ED
Bed/Stretcher in lowest position, wheels locked, appropriate side rails in place/Call bell, personal items and telephone in reach/Instruct patient to call for assistance before getting out of bed/chair/stretcher/Non-slip footwear applied when patient is off stretcher/[place] to call system/Physically safe environment - no spills, clutter or unnecessary equipment/Purposeful proactive rounding/Room/bathroom lighting operational, light cord in reach Bed/Stretcher in lowest position, wheels locked, appropriate side rails in place/Call bell, personal items and telephone in reach/Instruct patient to call for assistance before getting out of bed/chair/stretcher/Non-slip footwear applied when patient is off stretcher/[place] to call system/Physically safe environment - no spills, clutter or unnecessary equipment/Purposeful proactive rounding/Room/bathroom lighting operational, light cord in reach

## 2024-01-09 NOTE — ED PROVIDER NOTE - OBJECTIVE STATEMENT
74 yo M hx kyphoplasty L2 2 months ago in [facility] here with cc L sided chest pain (on palpation and coughing) with associated uri sx, lower abdominal pain without n/v/d or urinary sx, as well as pain across lower back 72 yo M hx kyphoplasty L2 2 months ago in [facility] here with cc L sided chest pain (on palpation and coughing) with associated uri sx, lower abdominal pain without n/v/d or urinary sx, as well as pain across lower back 74 yo M hx kyphoplasty L2 2 months ago in [facility] here with cc L sided chest pain (on palpation and coughing) with associated uri sx, lower abdominal pain without n/v/d or urinary sx, as well as pain across lower back. No fever. No weakness. Pt witnessed multiple times walking from bed to bathroom without use of his cane from home with no difficulty. No midline ttp on exam. 74 yo M hx kyphoplasty L2 2 months ago in [facility] here with cc L sided chest pain (on palpation and coughing) with associated uri sx, lower abdominal pain without n/v/d or urinary sx, as well as pain across lower back. No fever. No weakness. Pt witnessed multiple times walking from bed to bathroom without use of his cane from home with no difficulty. No midline ttp on exam.

## 2024-01-09 NOTE — CONSULT NOTE ADULT - SUBJECTIVE AND OBJECTIVE BOX
ORTHO CONSULT NOTE:    HPI:  64yo M presenting w/ lower back pain. Approximately three months ago he sustained what sounds to be a L2 vertebral compression fx s/p mechanical fall. Underwent L2 kyphoplasty at [facility] in [place] approximately 2 months ago. Postop course was complicated w/ what sounds to be L1-2 osteodiscitis for which he received IVABX. Does not remember specific Cx results or abx courses but reports that he got a biopsy that was "negative". Got a second opinion at [place] from a Dr. Steen who insisted he still had infection in the spine and was advised to follow up if he has worsening pain or fevers/chills. Per pt he saw Dr. Slaughter approximately 10 days ago who advised similarly. Currently presenting w/ worsening lower back pain w/o radiation to either LE. No associated numbness/tingling or weakness. Denies changes in baseline urinary/bowel patterns (has hx of BPH) or saddle anesthesia. Endorses some fatigue but no overt fevers/chills. Additionally presents w/ some CP, cough, and abdominal pain.    Vital Signs (24 Hrs):  T(C): 36.7 (01-09-24 @ 07:49), Max: 36.8 (01-09-24 @ 00:08)  HR: 66 (01-09-24 @ 07:49) (66 - 87)  BP: 166/96 (01-09-24 @ 07:49) (135/91 - 166/96)  RR: 18 (01-09-24 @ 07:49) (17 - 18)  SpO2: 100% (01-09-24 @ 07:49) (98% - 100%)  Wt(kg): --    LABS:                          13.2   7.68  )-----------( 258      ( 09 Jan 2024 04:16 )             39.1     01-09    138  |  107  |  18  ----------------------------<  173<H>  4.2   |  26  |  0.69    Ca    8.7      09 Jan 2024 04:16    TPro  7.7  /  Alb  3.3  /  TBili  0.3  /  DBili  x   /  AST  21  /  ALT  35  /  AlkPhos  130<H>  01-09    LIVER FUNCTIONS - ( 09 Jan 2024 04:16 )  Alb: 3.3 g/dL / Pro: 7.7 gm/dL / ALK PHOS: 130 U/L / ALT: 35 U/L / AST: 21 U/L / GGT: x           PT/INR - ( 09 Jan 2024 06:43 )   PT: 13.4 sec;   INR: 1.13 ratio         PTT - ( 09 Jan 2024 06:43 )  PTT:32.1 sec    EXAM:  General: NAD, awake and alert  Spine: no TTP throughout  Motor:                   C5                C6              C7               C8           T1   R            5/5                5/5            5/5             5/5          5/5  L             5/5               5/5             5/5             5/5          5/5                L2             L3             L4               L5            S1  R         5/5           5/5          5/5             5/5           5/5  L          5/5          5/5           5/5             5/5           5/5    Sensory:            C5         C6         C7      C8       T1        (0=absent, 1=impaired, 2=normal, NT=not testable)  R         2            2           2        2         2  L          2            2           2        2         2               L2          L3         L4      L5       S1         (0=absent, 1=impaired, 2=normal, NT=not testable)  R         2            2            2        2        2  L          2            2           2        2         2    Negative Babinski b/l  Negative Clonus b/l  Negative Uhggins b/l    IMAGING:    CT LSp: Postoperative changes for L2 kyphoplasty. Erosive changes at L1-2. C/f kyphoplasty cement invading on the surrounding vasculature. No acute fx.    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 Slaughter and adjust plan as needed ORTHO CONSULT NOTE:    HPI:  64yo M presenting w/ lower back pain. Approximately three months ago he sustained what sounds to be a L2 vertebral compression fx s/p mechanical fall. Underwent L2 kyphoplasty at [facility] in [place] approximately 2 months ago. Postop course was complicated w/ what sounds to be L1-2 osteodiscitis for which he received IVABX. Does not remember specific Cx results or abx courses but reports that he got a biopsy that was "negative". Got a second opinion at [place] from a Dr. Steen who insisted he still had infection in the spine and was advised to follow up if he has worsening pain or fevers/chills. Per pt he saw Dr. Slaughter approximately 10 days ago who advised similarly. Currently presenting w/ worsening lower back pain w/o radiation to either LE. No associated numbness/tingling or weakness. Denies changes in baseline urinary/bowel patterns (has hx of BPH) or saddle anesthesia. Endorses some fatigue but no overt fevers/chills. Additionally presents w/ some CP, cough, and abdominal pain.    Vital Signs (24 Hrs):  T(C): 36.7 (01-09-24 @ 07:49), Max: 36.8 (01-09-24 @ 00:08)  HR: 66 (01-09-24 @ 07:49) (66 - 87)  BP: 166/96 (01-09-24 @ 07:49) (135/91 - 166/96)  RR: 18 (01-09-24 @ 07:49) (17 - 18)  SpO2: 100% (01-09-24 @ 07:49) (98% - 100%)  Wt(kg): --    LABS:                          13.2   7.68  )-----------( 258      ( 09 Jan 2024 04:16 )             39.1     01-09    138  |  107  |  18  ----------------------------<  173<H>  4.2   |  26  |  0.69    Ca    8.7      09 Jan 2024 04:16    TPro  7.7  /  Alb  3.3  /  TBili  0.3  /  DBili  x   /  AST  21  /  ALT  35  /  AlkPhos  130<H>  01-09    LIVER FUNCTIONS - ( 09 Jan 2024 04:16 )  Alb: 3.3 g/dL / Pro: 7.7 gm/dL / ALK PHOS: 130 U/L / ALT: 35 U/L / AST: 21 U/L / GGT: x           PT/INR - ( 09 Jan 2024 06:43 )   PT: 13.4 sec;   INR: 1.13 ratio         PTT - ( 09 Jan 2024 06:43 )  PTT:32.1 sec    EXAM:  General: NAD, awake and alert  Spine: no TTP throughout  Motor:                   C5                C6              C7               C8           T1   R            5/5                5/5            5/5             5/5          5/5  L             5/5               5/5             5/5             5/5          5/5                L2             L3             L4               L5            S1  R         5/5           5/5          5/5             5/5           5/5  L          5/5          5/5           5/5             5/5           5/5    Sensory:            C5         C6         C7      C8       T1        (0=absent, 1=impaired, 2=normal, NT=not testable)  R         2            2           2        2         2  L          2            2           2        2         2               L2          L3         L4      L5       S1         (0=absent, 1=impaired, 2=normal, NT=not testable)  R         2            2            2        2        2  L          2            2           2        2         2    Negative Babinski b/l  Negative Clonus b/l  Negative Huggins b/l    IMAGING:    CT LSp: Postoperative changes for L2 kyphoplasty. Erosive changes at L1-2. C/f kyphoplasty cement invading on the surrounding vasculature. No acute fx.    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 Slaughter and adjust plan as needed

## 2024-01-09 NOTE — ED ADULT NURSE NOTE - OBJECTIVE STATEMENT
Pt aaox3. Walk with cane. Pt c/o lower back pain since surgery and abdominal pain starting yesterday.  Denies any n/v/d but is c/o of frequent BM every 3hrs.  denies any sick contact, recent travels, fever, chills. Pmhx of BPH, lower back surgery.  nkda.

## 2024-01-09 NOTE — ED ADULT TRIAGE NOTE - CHIEF COMPLAINT QUOTE
bibems for lower back pain since surgery as well as abd pain starting yesterday.  Denies any n/v/d but is c/o of frequent BM every 3hrs.  denies any sick contact, recent travels, fever, chills.   hx of BPH, lower back surgery.  nkda.

## 2024-01-09 NOTE — H&P ADULT - NSHPADDITIONALINFOADULT_GEN_ALL_CORE
< from: MR Cervical Spine w/wo IV Cont (01.09.24 @ 11:39) >    IMPRESSION:    1. CERVICAL SPINE:   Moderate mid cervical degenerative disc disease   includes multiple disc protrusions (disc herniations) that cause ventral   cord deformity.   No abnormal enhancement.    2. THORACIC SPINE:   Mild thoracic degenerative disc disease. No thoracic   vertebral fracture. No abnormal enhancement.    3. LUMBAR SPINE:   Mild enhancement and fluid within the L1-L2 disc space   is nonspecific, noting endplate changes that are at least in part   long-standing also edema and enhancement within the L1 vertebral body   suggesting an acute process.  Slight L1 superior endplate deformity is   age indeterminate. Septic discitis and L1 vertebral osteomyelitis may   represent active or treated disease, noting vacuum phenomenon within the   L1-L2 disc space on concurrent CT that is typical for disc degeneration   but not typical for septic discitis.  Recommend short interval follow-up   including monitoring of endplate erosive changes best demonstrated by CT.    L2 vertebroplasty.  Mild L4 superior endplate deformity with edema and   enhancement, overall morphology consistent with a Schmorl's node possibly   recent.  Widespread low to intermediate grade lumbar degenerative disc   disease result primarily in foraminal stenosis at the lowest 2   intervertebral disc levels without high-grade central canal stenosis.    Mild kyphotic deformity apex at L1.    4. Recommend direct comparison to previous spine imaging with regard to   the above findings, particularly findings of septic discitis reported on   outside imaging. When these comparison images become available, an   addendum will be provided.    5. See separate body CT report regarding additional findings    --- End of Report ---    < end of copied text >

## 2024-01-09 NOTE — ED PROVIDER NOTE - CLINICAL SUMMARY MEDICAL DECISION MAKING FREE TEXT BOX
Pt with chest pain, cough, gi sx likely associated with viral uri sx. Pt with acute on chronic lbp and hx back surgery and states had post op infxn/complication 2 months ago. Treated in hospital in [facility]. Pt with chest pain, cough, gi sx likely associated with viral uri sx. Pt with acute on chronic lbp and hx back surgery and states had post op infxn/complication 2 months ago. Treated in hospital in [facility].

## 2024-01-09 NOTE — H&P ADULT - HISTORY OF PRESENT ILLNESS
73M PMH BPH, Lumbar compression fracture s/p kyphoplasty in 8/2023 in [place] now presenting with lower back pain, chronic x 3 months, worse over past week.  States he had sustained L2 compression fracture in [place], underwent kyphoplasty with postop course complicated by discitis, s/p antibiotic course through November 2023.  Reports some improvement in pain after first few days, but then recurred.  States he got a second opinion at [place] from a Dr. Steen who insisted he still had infection in the spine and was advised to follow up if he has worsening pain or fevers/chills. Per pt he saw Dr. Slaughter approximately 10 days ago who advised similarly.  Now with 6/10 lower back pain.  Not associated numbness/tingling or weakness. Denies changes in baseline urinary/bowel patterns (has hx of BPH) or saddle anesthesia.  Also c/o lower abdominal pain, ? radiating from back.  No fever / chills / SOB.  Also c/o loose BMs, 3-4 per day over past month.  No additional complaints.     PMH: BPH, Lumbar Discitis  PSH: Lumbar Compression Fracture s/p Kyphoplasty 8/2023.  H/o R elbow fracture.    FAMILY HISTORY: reviewed and negative.  SOCIAL HISTORY: - alcohol, - IVDA, - smoking.  REVIEW OF SYSTEMS: As above.  All remaining ROS are negative.   MEDICATIONS: Flomax 0.4mg daily.    73M PMH BPH, Lumbar compression fracture s/p kyphoplasty in 8/2023 in [place] now presenting with lower back pain, chronic x 3 months, worse over past week.  States he had sustained L2 compression fracture in [place], underwent kyphoplasty with postop course complicated by discitis, s/p antibiotic course through November 2023.  Reports some improvement in pain after first few days, but then recurred.  States he got a second opinion at [place] from a Dr. Steen who insisted he still had infection in the spine and was advised to follow up if he has worsening pain or fevers/chills. Per pt he saw Dr. Slaughter approximately 10 days ago who advised similarly.  Now with 6/10 lower back pain.  Not associated numbness/tingling or weakness. Denies changes in baseline urinary/bowel patterns (has hx of BPH) or saddle anesthesia.  Also c/o lower abdominal pain, ? radiating from back.  No fever / chills / SOB.  Also c/o loose BMs, 3-4 per day over past month.  No additional complaints.     PMH: BPH, Lumbar Discitis  PSH: Lumbar Compression Fracture s/p Kyphoplasty 8/2023.  H/o R elbow fracture.    FAMILY HISTORY: reviewed and negative.  SOCIAL HISTORY: - alcohol, - IVDA, - smoking.  REVIEW OF SYSTEMS: As above.  All remaining ROS are negative.   MEDICATIONS: Flomax 0.4mg daily.

## 2024-01-09 NOTE — CHART NOTE - NSCHARTNOTEFT_GEN_A_CORE
Orthopedic Surgery    Known patient of Dr. Slaughter s/p L2 Kyphoplasty performed in [place] several months ago. Post-operative course complicated by OM/discitis at L1-2 levels previously treated with 2 months of IV Abx. Outside neuraxial MRI without contrast performed 11/23/23 which was shared with radiology today. MRI performed this current admission now updated (Addendum 1) based on prior comparison to show relatively unchanged, slightly improved appearance of L1/2 findings. Observation recommended from orthopedic standpoint at this time, however would appreciate ID input regarding need for continued antibiotics.

## 2024-01-09 NOTE — H&P ADULT - ASSESSMENT
73M PMH BPH, Lumbar compression fracture s/p kyphoplasty in 8/2023 in [place] now presenting with lower back pain, chronic x 3 months, worse over past week.    # Back Pain, Suspected L1 Discitis / Osteomyelitis - Ortho input appreciated.  Started on Zosyn, Vancomycin.  Will continue Zosyn for now.  ID input requested.  Pain control.  F/u cultures.  OOB with PT.  # Abdominal Pain / Diarrhea- ? due to Discitis.  Pt had received an extensive antibiotic course in [place].  Will check stool studies.   # BPH - continue Flomax.  # Abnormal CT - CT Lumbar spine showing possible kyphoplasty material within lumbar vein.  Discussed with Dr. Brenner vascular, states she has discussed with Dr. Slaughter.  CT Chest not suggestive of PE.  Observation.   # Inpatient DVT Prophylaxis - Lower extremity intermittent compression devices.

## 2024-01-09 NOTE — PHARMACOTHERAPY INTERVENTION NOTE - COMMENTS
Per pharmacy policy for optimization on antimicrobials, discontinued piperacillin/tazobactam 3.375g order as patient already received initial dose over 30 minutes in the ED today 1/9 ~1530. 2nd dose of piperacillin/tazobactam to be administered 4 hours after initial dose ~2000.

## 2024-01-10 LAB
ANION GAP SERPL CALC-SCNC: 4 MMOL/L — LOW (ref 5–17)
ANION GAP SERPL CALC-SCNC: 4 MMOL/L — LOW (ref 5–17)
BUN SERPL-MCNC: 16 MG/DL — SIGNIFICANT CHANGE UP (ref 7–23)
BUN SERPL-MCNC: 16 MG/DL — SIGNIFICANT CHANGE UP (ref 7–23)
C DIFF BY PCR RESULT: SIGNIFICANT CHANGE UP
C DIFF BY PCR RESULT: SIGNIFICANT CHANGE UP
CALCIUM SERPL-MCNC: 7.9 MG/DL — LOW (ref 8.5–10.1)
CALCIUM SERPL-MCNC: 7.9 MG/DL — LOW (ref 8.5–10.1)
CHLORIDE SERPL-SCNC: 108 MMOL/L — SIGNIFICANT CHANGE UP (ref 96–108)
CHLORIDE SERPL-SCNC: 108 MMOL/L — SIGNIFICANT CHANGE UP (ref 96–108)
CO2 SERPL-SCNC: 25 MMOL/L — SIGNIFICANT CHANGE UP (ref 22–31)
CO2 SERPL-SCNC: 25 MMOL/L — SIGNIFICANT CHANGE UP (ref 22–31)
CREAT SERPL-MCNC: 0.65 MG/DL — SIGNIFICANT CHANGE UP (ref 0.5–1.3)
CREAT SERPL-MCNC: 0.65 MG/DL — SIGNIFICANT CHANGE UP (ref 0.5–1.3)
CULTURE RESULTS: SIGNIFICANT CHANGE UP
CULTURE RESULTS: SIGNIFICANT CHANGE UP
EGFR: 99 ML/MIN/1.73M2 — SIGNIFICANT CHANGE UP
EGFR: 99 ML/MIN/1.73M2 — SIGNIFICANT CHANGE UP
GLUCOSE BLDC GLUCOMTR-MCNC: 112 MG/DL — HIGH (ref 70–99)
GLUCOSE BLDC GLUCOMTR-MCNC: 112 MG/DL — HIGH (ref 70–99)
GLUCOSE SERPL-MCNC: 153 MG/DL — HIGH (ref 70–99)
GLUCOSE SERPL-MCNC: 153 MG/DL — HIGH (ref 70–99)
HCT VFR BLD CALC: 37 % — LOW (ref 39–50)
HCT VFR BLD CALC: 37 % — LOW (ref 39–50)
HCV AB S/CO SERPL IA: 0.43 S/CO — SIGNIFICANT CHANGE UP (ref 0–0.99)
HCV AB S/CO SERPL IA: 0.43 S/CO — SIGNIFICANT CHANGE UP (ref 0–0.99)
HCV AB SERPL-IMP: SIGNIFICANT CHANGE UP
HCV AB SERPL-IMP: SIGNIFICANT CHANGE UP
HGB BLD-MCNC: 12.2 G/DL — LOW (ref 13–17)
HGB BLD-MCNC: 12.2 G/DL — LOW (ref 13–17)
MCHC RBC-ENTMCNC: 27.2 PG — SIGNIFICANT CHANGE UP (ref 27–34)
MCHC RBC-ENTMCNC: 27.2 PG — SIGNIFICANT CHANGE UP (ref 27–34)
MCHC RBC-ENTMCNC: 33 G/DL — SIGNIFICANT CHANGE UP (ref 32–36)
MCHC RBC-ENTMCNC: 33 G/DL — SIGNIFICANT CHANGE UP (ref 32–36)
MCV RBC AUTO: 82.4 FL — SIGNIFICANT CHANGE UP (ref 80–100)
MCV RBC AUTO: 82.4 FL — SIGNIFICANT CHANGE UP (ref 80–100)
NRBC # BLD: 0 /100 WBCS — SIGNIFICANT CHANGE UP (ref 0–0)
NRBC # BLD: 0 /100 WBCS — SIGNIFICANT CHANGE UP (ref 0–0)
PLATELET # BLD AUTO: 222 K/UL — SIGNIFICANT CHANGE UP (ref 150–400)
PLATELET # BLD AUTO: 222 K/UL — SIGNIFICANT CHANGE UP (ref 150–400)
POTASSIUM SERPL-MCNC: 3.7 MMOL/L — SIGNIFICANT CHANGE UP (ref 3.5–5.3)
POTASSIUM SERPL-MCNC: 3.7 MMOL/L — SIGNIFICANT CHANGE UP (ref 3.5–5.3)
POTASSIUM SERPL-SCNC: 3.7 MMOL/L — SIGNIFICANT CHANGE UP (ref 3.5–5.3)
POTASSIUM SERPL-SCNC: 3.7 MMOL/L — SIGNIFICANT CHANGE UP (ref 3.5–5.3)
RBC # BLD: 4.49 M/UL — SIGNIFICANT CHANGE UP (ref 4.2–5.8)
RBC # BLD: 4.49 M/UL — SIGNIFICANT CHANGE UP (ref 4.2–5.8)
RBC # FLD: 12.4 % — SIGNIFICANT CHANGE UP (ref 10.3–14.5)
RBC # FLD: 12.4 % — SIGNIFICANT CHANGE UP (ref 10.3–14.5)
SODIUM SERPL-SCNC: 137 MMOL/L — SIGNIFICANT CHANGE UP (ref 135–145)
SODIUM SERPL-SCNC: 137 MMOL/L — SIGNIFICANT CHANGE UP (ref 135–145)
SPECIMEN SOURCE: SIGNIFICANT CHANGE UP
SPECIMEN SOURCE: SIGNIFICANT CHANGE UP
WBC # BLD: 6.86 K/UL — SIGNIFICANT CHANGE UP (ref 3.8–10.5)
WBC # BLD: 6.86 K/UL — SIGNIFICANT CHANGE UP (ref 3.8–10.5)
WBC # FLD AUTO: 6.86 K/UL — SIGNIFICANT CHANGE UP (ref 3.8–10.5)
WBC # FLD AUTO: 6.86 K/UL — SIGNIFICANT CHANGE UP (ref 3.8–10.5)

## 2024-01-10 PROCEDURE — 99222 1ST HOSP IP/OBS MODERATE 55: CPT

## 2024-01-10 PROCEDURE — 99233 SBSQ HOSP IP/OBS HIGH 50: CPT

## 2024-01-10 RX ORDER — ENOXAPARIN SODIUM 100 MG/ML
40 INJECTION SUBCUTANEOUS EVERY 24 HOURS
Refills: 0 | Status: DISCONTINUED | OUTPATIENT
Start: 2024-01-10 | End: 2024-01-12

## 2024-01-10 RX ADMIN — LIDOCAINE 1 PATCH: 4 CREAM TOPICAL at 07:15

## 2024-01-10 RX ADMIN — Medication 15 MILLIGRAM(S): at 07:15

## 2024-01-10 RX ADMIN — LIDOCAINE 1 PATCH: 4 CREAM TOPICAL at 09:39

## 2024-01-10 RX ADMIN — ENOXAPARIN SODIUM 40 MILLIGRAM(S): 100 INJECTION SUBCUTANEOUS at 22:59

## 2024-01-10 RX ADMIN — PIPERACILLIN AND TAZOBACTAM 25 GRAM(S): 4; .5 INJECTION, POWDER, LYOPHILIZED, FOR SOLUTION INTRAVENOUS at 07:27

## 2024-01-10 RX ADMIN — TAMSULOSIN HYDROCHLORIDE 0.4 MILLIGRAM(S): 0.4 CAPSULE ORAL at 21:58

## 2024-01-10 NOTE — PATIENT PROFILE ADULT - FALL HARM RISK - HARM RISK INTERVENTIONS
Assistance OOB with selected safe patient handling equipment/Communicate Risk of Fall with Harm to all staff/Discuss with provider need for PT consult/Monitor gait and stability/Provide patient with walking aids - walker, cane, crutches/Reinforce activity limits and safety measures with patient and family/Tailored Fall Risk Interventions/Visual Cue: Yellow wristband and red socks/Bed in lowest position, wheels locked, appropriate side rails in place/Call bell, personal items and telephone in reach/Instruct patient to call for assistance before getting out of bed or chair/Non-slip footwear when patient is out of bed/[place] to call system/Physically safe environment - no spills, clutter or unnecessary equipment/Purposeful Proactive Rounding/Room/bathroom lighting operational, light cord in reach Assistance OOB with selected safe patient handling equipment/Communicate Risk of Fall with Harm to all staff/Discuss with provider need for PT consult/Monitor gait and stability/Provide patient with walking aids - walker, cane, crutches/Reinforce activity limits and safety measures with patient and family/Tailored Fall Risk Interventions/Visual Cue: Yellow wristband and red socks/Bed in lowest position, wheels locked, appropriate side rails in place/Call bell, personal items and telephone in reach/Instruct patient to call for assistance before getting out of bed or chair/Non-slip footwear when patient is out of bed/[place] to call system/Physically safe environment - no spills, clutter or unnecessary equipment/Purposeful Proactive Rounding/Room/bathroom lighting operational, light cord in reach

## 2024-01-10 NOTE — PROGRESS NOTE ADULT - SUBJECTIVE AND OBJECTIVE BOX
EXAM:  General: NAD, awake and alert  Spine: no TTP throughout  Motor:                   C5                C6              C7               C8           T1   R            5/5                5/5            5/5             5/5          5/5  L             5/5               5/5             5/5             5/5          5/5                L2             L3             L4               L5            S1  R         5/5           5/5          5/5             5/5           5/5  L          5/5          5/5           5/5             5/5           5/5    Sensory:            C5         C6         C7      C8       T1        (0=absent, 1=impaired, 2=normal, NT=not testable)  R         2            2           2        2         2  L          2            2           2        2         2               L2          L3         L4      L5       S1         (0=absent, 1=impaired, 2=normal, NT=not testable)  R         2            2            2        2        2  L          2            2           2        2         2    Negative Babinski b/l  Negative Clonus b/l  Negative Huggins b/l    A/P:    66yo M s/p L2 kyphoplasty ~2 months ago c/b likely L1-2 osteodiscitis s/p IVABX, presenting for similar symptoms and MR c/w L1-2 osteodiscitis and possible new L4 VCF    -Currently no c/f cord compression/cauda equina given exam  -ESR 21, CRP <3  -BCx/UCx pending  -Vascular Sx consulted for cement extravasation to vasculature, no acute intervention indicated, appreciate recs  -No PE on CTPE  -WBAT  -IVABX per primary/ID for suspected osteodiscitis  -Pain control as needed  -Abdominal binder for comfort prn  -Will d/w Dr. Slaughter and adjust plan as needed   EXAM:  General: NAD, awake and alert  Spine: no TTP throughout  Motor:                   C5                C6              C7               C8           T1   R            5/5                5/5            5/5             5/5          5/5  L             5/5               5/5             5/5             5/5          5/5                L2             L3             L4               L5            S1  R         5/5           5/5          5/5             5/5           5/5  L          5/5          5/5           5/5             5/5           5/5    Sensory:            C5         C6         C7      C8       T1        (0=absent, 1=impaired, 2=normal, NT=not testable)  R         2            2           2        2         2  L          2            2           2        2         2               L2          L3         L4      L5       S1         (0=absent, 1=impaired, 2=normal, NT=not testable)  R         2            2            2        2        2  L          2            2           2        2         2    Negative Babinski b/l  Negative Clonus b/l  Negative Huggins b/l    A/P:    64yo M s/p L2 kyphoplasty ~2 months ago c/b likely L1-2 osteodiscitis s/p IVABX, presenting for similar symptoms and MR c/w L1-2 osteodiscitis and possible new L4 VCF    -Currently no c/f cord compression/cauda equina given exam  -ESR 21, CRP <3  -BCx/UCx pending  -Vascular Sx consulted for cement extravasation to vasculature, no acute intervention indicated, appreciate recs  -No PE on CTPE  -WBAT  -IVABX per primary/ID for suspected osteodiscitis  -Pain control as needed  -Abdominal binder for comfort prn  -Will d/w Dr. Slaughter and adjust plan as needed   SUBJECTIVE:       Pt seen and examined at bedside. Admitted to medicine for IVABX. Patient feeling better overall. Reports improved back pain and CP but ongoing abdominal pain. Currently no SOB, N/V, F/C, new N/T.    Vital Signs (24 Hrs):  T(C): 36.8 (01-10-24 @ 07:53), Max: 36.8 (01-09-24 @ 12:11)  HR: 70 (01-10-24 @ 07:53) (61 - 79)  BP: 134/79 (01-10-24 @ 07:53) (134/79 - 154/83)  RR: 16 (01-10-24 @ 07:53) (16 - 18)  SpO2: 98% (01-10-24 @ 07:53) (97% - 100%)  Wt(kg): --    LABS:                          12.2   6.86  )-----------( 222      ( 10 Solitario 2024 05:35 )             37.0     01-10    137  |  108  |  16  ----------------------------<  153<H>  3.7   |  25  |  0.65    Ca    7.9<L>      10 Solitario 2024 05:35    TPro  7.7  /  Alb  3.3  /  TBili  0.3  /  DBili  x   /  AST  21  /  ALT  35  /  AlkPhos  130<H>  01-09    LIVER FUNCTIONS - ( 09 Jan 2024 04:16 )  Alb: 3.3 g/dL / Pro: 7.7 gm/dL / ALK PHOS: 130 U/L / ALT: 35 U/L / AST: 21 U/L / GGT: x           PT/INR - ( 09 Jan 2024 06:43 )   PT: 13.4 sec;   INR: 1.13 ratio         PTT - ( 09 Jan 2024 06:43 )  PTT:32.1 sec    EXAM:  General: NAD, awake and alert  Spine: no TTP throughout  Motor:                   C5                C6              C7               C8           T1   R            5/5                5/5            5/5             5/5          5/5  L             5/5               5/5             5/5             5/5          5/5                L2             L3             L4               L5            S1  R         5/5           5/5          5/5             5/5           5/5  L          5/5          5/5           5/5             5/5           5/5    Sensory:            C5         C6         C7      C8       T1        (0=absent, 1=impaired, 2=normal, NT=not testable)  R         2            2           2        2         2  L          2            2           2        2         2               L2          L3         L4      L5       S1         (0=absent, 1=impaired, 2=normal, NT=not testable)  R         2            2            2        2        2  L          2            2           2        2         2    Negative Babinski b/l  Negative Clonus b/l  Negative Huggins b/l    A/P:    66yo M s/p L2 kyphoplasty ~2 months ago c/b likely L1-2 osteodiscitis s/p IVABX, presenting for similar symptoms and MR c/w L1-2 osteodiscitis and possible new L4 VCF    -Currently no c/f cord compression/cauda equina given exam  -ESR 21, CRP <3  -BCx/UCx pending  -Vascular Sx consulted for cement extravasation to vasculature, no acute intervention indicated, appreciate recs  -No PE on CTPE  -WBAT  -IVABX per primary/ID for suspected osteodiscitis  -Pain control as needed  -Abdominal binder for comfort prn  -No acute ortho intervention indicated considering presentation and imaging, please reconsult as needed  -Ortho stable for DC  -Will d/w Dr. Slaughter and adjust plan as needed

## 2024-01-10 NOTE — CONSULT NOTE ADULT - CONSULT REASON
osteomyelitis
Lower back pain  time consulted: 0600  time seen: 0610
curvilinear hyperdensity extending from the left anterior L2 vertebral body towards the IVC (2:48-54), which may reflect kyphoplasty material within a lumbar vein

## 2024-01-10 NOTE — PROGRESS NOTE ADULT - ASSESSMENT
73M PMH BPH, Lumbar compression fracture s/p kyphoplasty in 8/2023 in [place] now presenting with lower back pain, chronic x 3 months, worse over past week.    # Back Pain, Suspected L1 Discitis / Osteomyelitis is ruled out. Back pain Likely L4 superior endplate deformity. I have consulted and discussed with ID Dr Harris. Patient has low ESR, CRP and has finished 6 weeks of iv antibiotics. MRI spine likely showing resolved previous infection. cont to monitor blood cultures and back pain improvement.   # Abdominal Pain / Diarrhea- check stool studies.   # BPH - continue Flomax.  # Inpatient DVT Prophylaxis - start lovenox.     Full code     Time spent by me managing the patient including but not limited to reviewing the chart, discussion with the nurse and Family, physical exam and assessment and plan is 54 mins

## 2024-01-10 NOTE — CONSULT NOTE ADULT - ASSESSMENT
73M PMH BPH, Lumbar compression fracture s/p kyphoplasty in 8/2023 in [place] now presenting with lower back pain, chronic x 3 months, worse over past week.  States he had sustained L2 compression fracture in [place], underwent kyphoplasty with postop course complicated by discitis, s/p antibiotic course through December.   Has no fevers, no chills  normal wbc count and normal CRP   I reviewed the previous MRIs along with the latest one just done with neuroradiologist: there is improvement in the paravertebral inflammation and phlegmon. What was seen on L1 and L2 prior is drastically improved. There is a new L4 compression fracture.   Given these findings and the prolonged course of antibiotics he already received I would monitor him off antibiotics   If there is a true infection it will be helpful to identify the organism(s) involved. Had biopsy with culture in [place] which was negative   Does not appear to be Andrea disease but can present as extrapulmonary TB (no need for isolation)   He new pain is likely from his new compression fracture -etiology should be looked into     Plan:  stop antibiotics and monitor   Blood cultures x2 sets  Can send quantiferon   send HIV in AM   pain control   physical therapy   trend ESR and CRP  monitor for fevers   please workup his left sided chest pain   should have a DEXA scan outpatient   can repeat MRI in 6 weeks outpatient     Discussed with Dr. Mohamud and Dr Kasandra Harris, DO  Infectious Disease Attending  Reachable via Microsoft Teams or ID office: [phone]  After 5pm/weekends please call [phone] for all inquiries and new consults  73M PMH BPH, Lumbar compression fracture s/p kyphoplasty in 8/2023 in [place] now presenting with lower back pain, chronic x 3 months, worse over past week.  States he had sustained L2 compression fracture in [place], underwent kyphoplasty with postop course complicated by discitis, s/p antibiotic course through December.   Has no fevers, no chills  normal wbc count and normal CRP   I reviewed the previous MRIs along with the latest one just done with neuroradiologist: there is improvement in the paravertebral inflammation and phlegmon. What was seen on L1 and L2 prior is drastically improved. There is a new L4 compression fracture.   Given these findings and the prolonged course of antibiotics he already received I would monitor him off antibiotics   If there is a true infection it will be helpful to identify the organism(s) involved. Had biopsy with culture in [place] which was negative   Does not appear to be Andrea disease but can present as extrapulmonary TB (no need for isolation)   He new pain is likely from his new compression fracture -etiology should be looked into     Plan:  stop antibiotics and monitor   Blood cultures x2 sets  Can send quantiferon   send HIV in AM   pain control   physical therapy   trend ESR and CRP  monitor for fevers   please workup his left sided chest pain   should have a DEXA scan outpatient   can repeat MRI in 6 weeks outpatient     Discussed with Dr. Mohamud and Dr Kasandra Harris, DO  Infectious Disease Attending  Reachable via Microsoft Teams or ID office: [phone]  After 5pm/weekends please call [phone] for all inquiries and new consults

## 2024-01-10 NOTE — PROGRESS NOTE ADULT - SUBJECTIVE AND OBJECTIVE BOX
CHIEF COMPLAINT: Low back pain   chest pain better  no sob  no fever  reported finished 6 weeks of iv antibiotics for suspected OM back.   no new focal deficit reported      PHYSICAL EXAM:    GENERAL: Moderately built, no acute distress   CHEST/LUNG:  No wheezing, no crackles   HEART: Regular rate and rhythm; No murmurs  ABDOMEN: Soft, Nontender, Nondistended; Bowel sounds present  EXTREMITIES:  No clubbing, cyanosis, or edema  NERVOUS SYSTEM:  Grossly non focal.  Psychiatry: AAO x 3, mood is appropriate       OBJECTIVE DATA:   Vital Signs Last 24 Hrs  T(C): 36.5 (10 Solitario 2024 17:00), Max: 37.1 (10 Solitario 2024 11:40)  T(F): 97.7 (10 Solitario 2024 17:00), Max: 98.8 (10 Solitario 2024 11:40)  HR: 80 (10 Solitario 2024 17:00) (61 - 84)  BP: 159/91 (10 Solitario 2024 17:00) (134/79 - 159/91)  BP(mean): 100 (10 Solitario 2024 03:20) (100 - 100)  RR: 18 (10 Solitairo 2024 17:00) (16 - 18)  SpO2: 97% (10 Solitario 2024 17:00) (97% - 98%)    Parameters below as of 10 Solitario 2024 11:40  Patient On (Oxygen Delivery Method): room air               Daily     Daily   LABS:                        12.2   6.86  )-----------( 222      ( 10 Solitario 2024 05:35 )             37.0             01-10    137  |  108  |  16  ----------------------------<  153<H>  3.7   |  25  |  0.65    Ca    7.9<L>      10 Solitario 2024 05:35    TPro  7.7  /  Alb  3.3  /  TBili  0.3  /  DBili  x   /  AST  21  /  ALT  35  /  AlkPhos  130<H>  01-09              PT/INR - ( 09 Jan 2024 06:43 )   PT: 13.4 sec;   INR: 1.13 ratio         PTT - ( 09 Jan 2024 06:43 )  PTT:32.1 sec  Urinalysis Basic - ( 10 Solitario 2024 05:35 )    Color: x / Appearance: x / SG: x / pH: x  Gluc: 153 mg/dL / Ketone: x  / Bili: x / Urobili: x   Blood: x / Protein: x / Nitrite: x   Leuk Esterase: x / RBC: x / WBC x   Sq Epi: x / Non Sq Epi: x / Bacteria: x            CAPILLARY BLOOD GLUCOSE      POCT Blood Glucose.: 112 mg/dL (10 Solitario 2024 16:05)      Culture - Blood  Source: .Blood Blood-Peripheral  Preliminary Report (01-10):    No growth at 24 hours    Culture - Blood  Source: .Blood Blood-Peripheral  Preliminary Report (01-10):    No growth at 24 hours    Culture - Urine  Source: Clean Catch Clean Catch (Midstream)  Final Report (01-10):    <10,000 CFU/mL Normal Urogenital Nisha         Interval Radiology studies: reviewed by me    < from: CT Angio Chest PE Protocol w/ IV Cont (01.09.24 @ 11:49) >  IMPRESSION:  No evidence of pulmonary emboli or other acute finding    < end of copied text >    < from: MR Lumbar Spine w/wo IV Cont (01.09.24 @ 11:39) >  Outside MR lumbar without gadolinium enhancement 11/23/2023 images but   not report has become available. The imaging findings at L1 and L2 are   largely unchanged. The extensive marrow edema within L1 may have mildly   decreased. The extent of fluid within the L1-L2 disc space may also be   mildly decreased. The kyphotic deformity is similar. The L4 superior   endplate deformity and edema are an interval finding since November 2023.   The L2 vertebral plasty changes are unchanged from November 2023.    < end of copied text >    MEDICATIONS  (STANDING):  tamsulosin 0.4 milliGRAM(s) Oral at bedtime    MEDICATIONS  (PRN):  acetaminophen     Tablet .. 650 milliGRAM(s) Oral every 6 hours PRN Mild Pain (1 - 3), Moderate Pain (4 - 6)  bisacodyl 5 milliGRAM(s) Oral every 12 hours PRN Constipation  oxycodone    5 mG/acetaminophen 325 mG 2 Tablet(s) Oral every 6 hours PRN Severe Pain (7 - 10)       CHIEF COMPLAINT: Low back pain   chest pain better  no sob  no fever  reported finished 6 weeks of iv antibiotics for suspected OM back.   no new focal deficit reported      PHYSICAL EXAM:    GENERAL: Moderately built, no acute distress   CHEST/LUNG:  No wheezing, no crackles   HEART: Regular rate and rhythm; No murmurs  ABDOMEN: Soft, Nontender, Nondistended; Bowel sounds present  EXTREMITIES:  No clubbing, cyanosis, or edema  NERVOUS SYSTEM:  Grossly non focal.  Psychiatry: AAO x 3, mood is appropriate       OBJECTIVE DATA:   Vital Signs Last 24 Hrs  T(C): 36.5 (10 Solitario 2024 17:00), Max: 37.1 (10 Solitario 2024 11:40)  T(F): 97.7 (10 Solitario 2024 17:00), Max: 98.8 (10 Solitario 2024 11:40)  HR: 80 (10 Solitario 2024 17:00) (61 - 84)  BP: 159/91 (10 Solitario 2024 17:00) (134/79 - 159/91)  BP(mean): 100 (10 Solitario 2024 03:20) (100 - 100)  RR: 18 (10 Solitario 2024 17:00) (16 - 18)  SpO2: 97% (10 Solitario 2024 17:00) (97% - 98%)    Parameters below as of 10 Solitario 2024 11:40  Patient On (Oxygen Delivery Method): room air               Daily     Daily   LABS:                        12.2   6.86  )-----------( 222      ( 10 Solitario 2024 05:35 )             37.0             01-10    137  |  108  |  16  ----------------------------<  153<H>  3.7   |  25  |  0.65    Ca    7.9<L>      10 Solitario 2024 05:35    TPro  7.7  /  Alb  3.3  /  TBili  0.3  /  DBili  x   /  AST  21  /  ALT  35  /  AlkPhos  130<H>  01-09              PT/INR - ( 09 Jan 2024 06:43 )   PT: 13.4 sec;   INR: 1.13 ratio         PTT - ( 09 Jan 2024 06:43 )  PTT:32.1 sec  Urinalysis Basic - ( 10 Solitario 2024 05:35 )    Color: x / Appearance: x / SG: x / pH: x  Gluc: 153 mg/dL / Ketone: x  / Bili: x / Urobili: x   Blood: x / Protein: x / Nitrite: x   Leuk Esterase: x / RBC: x / WBC x   Sq Epi: x / Non Sq Epi: x / Bacteria: x            CAPILLARY BLOOD GLUCOSE      POCT Blood Glucose.: 112 mg/dL (10 Solitario 2024 16:05)      Culture - Blood  Source: .Blood Blood-Peripheral  Preliminary Report (01-10):    No growth at 24 hours    Culture - Blood  Source: .Blood Blood-Peripheral  Preliminary Report (01-10):    No growth at 24 hours    Culture - Urine  Source: Clean Catch Clean Catch (Midstream)  Final Report (01-10):    <10,000 CFU/mL Normal Urogenital Nisha         Interval Radiology studies: reviewed by me    < from: CT Angio Chest PE Protocol w/ IV Cont (01.09.24 @ 11:49) >  IMPRESSION:  No evidence of pulmonary emboli or other acute finding    < end of copied text >    < from: MR Lumbar Spine w/wo IV Cont (01.09.24 @ 11:39) >  Outside MR lumbar without gadolinium enhancement 11/23/2023 images but   not report has become available. The imaging findings at L1 and L2 are   largely unchanged. The extensive marrow edema within L1 may have mildly   decreased. The extent of fluid within the L1-L2 disc space may also be   mildly decreased. The kyphotic deformity is similar. The L4 superior   endplate deformity and edema are an interval finding since November 2023.   The L2 vertebral plasty changes are unchanged from November 2023.    < end of copied text >    MEDICATIONS  (STANDING):  tamsulosin 0.4 milliGRAM(s) Oral at bedtime    MEDICATIONS  (PRN):  acetaminophen     Tablet .. 650 milliGRAM(s) Oral every 6 hours PRN Mild Pain (1 - 3), Moderate Pain (4 - 6)  bisacodyl 5 milliGRAM(s) Oral every 12 hours PRN Constipation  oxycodone    5 mG/acetaminophen 325 mG 2 Tablet(s) Oral every 6 hours PRN Severe Pain (7 - 10)

## 2024-01-10 NOTE — PATIENT PROFILE ADULT - PATIENT REPRESENTATIVE: ( YOU CAN CHOOSE ANY PERSON THAT CAN ASSIST YOU WITH YOUR HEALTH CARE PREFERENCES, DOES NOT HAVE TO BE A SPOUSE, IMMEDIATE FAMILY OR SIGNIFICANT OTHER/PARTNER)
August 12, 2020      [facility] [facility] [address]  [facility] [address]  Phone: [phone]  Fax: [phone]       Patient: Kimi Cadena   YOB: 1957  Date of Visit: 08/12/2020    To Whom It May Concern:    Gerson Cadena  was at [facility] on 08/12/2020. She may return to work/school on 08/17/2020 with no restrictions. If you have any questions or concerns, or if I can be of further assistance, please do not hesitate to contact me.    Sincerely,    Kristen Tovar MA     
yes

## 2024-01-10 NOTE — CONSULT NOTE ADULT - SUBJECTIVE AND OBJECTIVE BOX
[facility]  INFECTIOUS DISEASES   [address] [place] [address]  Tel: [phone]     Fax: [phone]  ==============================================================================  DO Fredi Epstein MD Alexandra Gutman, NP   ==============================================================================    DA BARAKAT  MRN-[id number]  Male  73y ([birth year])        Patient is a 73y old  Male who presents with a chief complaint of Back pain (10 Solitario 2024 22:14)      HPI:  73M PMH BPH, Lumbar compression fracture s/p kyphoplasty in 8/2023 in [place] now presenting with lower back pain, chronic x 3 months, worse over past week.  States he had sustained L2 compression fracture in [place], underwent kyphoplasty with postop course complicated by discitis, s/p antibiotic course through November 2023.  Reports some improvement in pain after first few days, but then recurred.  States he got a second opinion at [place] from a Dr. Steen who insisted he still had infection in the spine and was advised to follow up if he has worsening pain or fevers/chills. Per pt he saw Dr. Slaughter approximately 10 days ago who advised similarly.  Now with 6/10 lower back pain.  Not associated numbness/tingling or weakness. Denies changes in baseline urinary/bowel patterns (has hx of BPH) or saddle anesthesia.  Also c/o lower abdominal pain, ? radiating from back.  No fever / chills / SOB.  Also c/o loose BMs, 3-4 per day over past month.  No additional complaints.     PMH: BPH, Lumbar Discitis  PSH: Lumbar Compression Fracture s/p Kyphoplasty 8/2023.  H/o R elbow fracture.    FAMILY HISTORY: reviewed and negative.  SOCIAL HISTORY: - alcohol, - IVDA, - smoking.  REVIEW OF SYSTEMS: As above.  All remaining ROS are negative.   MEDICATIONS: Flomax 0.4mg daily.    (09 Jan 2024 17:03)    patient received at least 6 weeks of vancomycin and cefepime. Had several visits to [facility] in [place]. Brought with him 2 MRIs. was diagnosed with typhoid fever and treated in [place]. Concern was after his kyphoplasty that he developed osteomyelitis/discitis. He has no fevers or chills. pain in low back localized without radiation  ID consulted for workup and antibiotic management     PAST MEDICAL & SURGICAL HISTORY:  BPH (benign prostatic hyperplasia)      Previous back surgery          Allergies  No Known Allergies        ANTIMICROBIALS:      MEDICATIONS  (STANDING):    piperacillin/tazobactam IVPB.-   25 mL/Hr IV Intermittent (01-09-24 @ 22:38)    piperacillin/tazobactam IVPB..   25 mL/Hr IV Intermittent (01-10-24 @ 07:27)    piperacillin/tazobactam IVPB...   200 mL/Hr IV Intermittent (01-09-24 @ 15:26)    vancomycin  IVPB.   250 mL/Hr IV Intermittent (01-09-24 @ 15:59)        OTHER MEDS: MEDICATIONS  (STANDING):  acetaminophen     Tablet .. 650 every 6 hours PRN  enoxaparin Injectable 40 every 24 hours  oxycodone    5 mG/acetaminophen 325 mG 2 every 6 hours PRN  tamsulosin 0.4 at bedtime      SOCIAL HISTORY:     Smoking Cigarettes [ ]Active [ ] Former [ x]Denies   ETOH [x ]denies [ ]Former [ ]Current Use denies   Drug Use [ x]Never [ ] Former [ ] Active     FAMILY HISTORY:  FH Diabetes mellitus     REVIEW OF SYSTEMS  [  ] ROS unobtainable because:    [ x ] All other systems negative except as noted below:	    Constitutional:  [ ] fever [ ] chills  [ ] weight loss  [ ] weakness  Skin:  [ ] rash [ ] phlebitis	  Eyes: [ ] icterus [ ] pain  [ ] discharge	  ENMT: [ ] sore throat  [ ] thrush [ ] ulcers [ ] exudates  Respiratory: [ ] dyspnea [ ] hemoptysis [ ] cough [ ] sputum	  Cardiovascular:  [ x] chest pain [ ] palpitations [ ] edema	  Gastrointestinal:  [ ] nausea [ ] vomiting [ ] diarrhea [ ] constipation [ x] pain	  Genitourinary:  [ ] dysuria [ ] frequency [ ] hematuria [ ] discharge [ ] flank pain  [ ] incontinence  Musculoskeletal:  [ ] myalgias [ ] arthralgias [ ] arthritis  [x ] back pain  Neurological:  [ ] headache [ ] seizures  [ ] confusion/altered mental status  Psychiatric:  [ ] anxiety [ ] depression	  Hematology/Lymphatics:  [ ] lymphadenopathy  Endocrine:  [ ] adrenal [ ] thyroid  Allergic/Immunologic:	 [ ] transplant [ ] seasonal    Vital Signs Last 24 Hrs  T(F): 97.8 (01-10-24 @ 23:35), Max: 98.8 (01-10-24 @ 11:40)    Vital Signs Last 24 Hrs  HR: 68 (01-10-24 @ 23:35) (61 - 84)  BP: 151/75 (01-10-24 @ 23:35) (134/79 - 159/91)  RR: 18 (01-10-24 @ 23:35)  SpO2: 97% (01-10-24 @ 23:35) (97% - 98%)  Wt(kg): --    PHYSICAL EXAM:  Constitutional: non-toxic, no distress  HEAD/EYES: anicteric, no conjunctival injection  ENT:  supple, no thrush  Cardiovascular:   normal S1, S2, no murmur, no edema  Respiratory:  clear BS bilaterally, no wheezes, no rales  GI:  soft, non-tender, normal bowel sounds  :  no hernandez, no CVA tenderness  Musculoskeletal:  no synovitis, normal ROM  Neurologic: awake and alert, normal strength, no focal findings  Skin:  no rash, no erythema, no phlebitis  Heme/Onc: no lymphadenopathy   Psychiatric:  awake, alert, appropriate mood          WBC Count: 6.86 K/uL (01-10 @ 05:35)  WBC Count: 7.68 K/uL (01-09 @ 04:16)      Auto Neutrophil %: 53.8 % (01-09-24 @ 04:16)  Auto Neutrophil #: 4.14 K/uL (01-09-24 @ 04:16)                            12.2   6.86  )-----------( 222      ( 10 Solitario 2024 05:35 )             37.0       01-10    137  |  108  |  16  ----------------------------<  153<H>  3.7   |  25  |  0.65    Ca    7.9<L>      10 Solitario 2024 05:35    TPro  7.7  /  Alb  3.3  /  TBili  0.3  /  DBili  x   /  AST  21  /  ALT  35  /  AlkPhos  130<H>  01-09      Creatinine Trend: 0.65<--, 0.69<--                  MICROBIOLOGY:  .Blood Blood-Peripheral  01-09-24   No growth at 24 hours  --  --      .Blood Blood-Peripheral  01-09-24   No growth at 24 hours  --  --      Clean Catch Clean Catch (Midstream)  01-09-24   <10,000 CFU/mL Normal Urogenital Nisha  --  --        SARS-CoV-2: NotDetec (09 Jan 2024 04:16)    HCV 0.43, Nonreact      [01-10-24 @ 05:35]      v    C Diff by PCR Result: NotDetec (01-09 @ 21:38)  Rapid RVP Result: NotDetec (01-09 @ 04:16)      C Diff by PCR Result: NotDetec (01-09-24 @ 21:38)        RADIOLOGY:  < from: MR Lumbar Spine w/wo IV Cont (01.09.24 @ 11:39) >  IMPRESSION:    1. CERVICAL SPINE:   Moderate mid cervical degenerative disc disease   includes multiple disc protrusions (disc herniations) that cause ventral   cord deformity.   No abnormal enhancement.    2. THORACIC SPINE:   Mild thoracic degenerative disc disease. No thoracic   vertebral fracture. No abnormal enhancement.    3. LUMBAR SPINE:   Mild enhancement and fluid within the L1-L2 disc space   is nonspecific, noting endplate changes that are at least in part   long-standing also edema and enhancement within the L1 vertebral body   suggesting an acute process.  Slight L1 superior endplate deformity is   age indeterminate. Septic discitis and L1 vertebral osteomyelitis may   represent active or treated disease, noting vacuum phenomenon within the   L1-L2 disc space on concurrent CT that is typical for disc degeneration   but not typical for septic discitis.  Recommend short interval follow-up   including monitoring of endplate erosive changes best demonstrated by CT.    L2 vertebroplasty.  Mild L4 superior endplate deformity with edema and   enhancement, overall morphology consistent with a Schmorl's node possibly   recent.  Widespread low to intermediate grade lumbar degenerative disc   disease result primarily in foraminal stenosis at the lowest 2   intervertebral disc levels without high-grade central canal stenosis.    Mild kyphotic deformity apex at L1.    4. Recommend direct comparison to previous spine imaging with regard to   the above findings, particularly findings of septic discitis reported on   outside imaging. When these comparison images become available, an   addendum will be provided.    5. See separate body CT report regarding additional findings      MR Lumbar Spine w/wo IV Cont (01.09.24 @ 11:39) >  *** ADDENDUM # 1 ***    Outside MR lumbar without gadolinium enhancement 11/23/2023 images but   not report has become available. The imaging findings at L1 and L2 are   largely unchanged. The extensive marrow edema within L1 may have mildly   decreased. The extent of fluid within the L1-L2 disc space may also be   mildly decreased. The kyphotic deformity is similar. The L4 superior   endplate deformity and edema are an interval finding since November 2023.   The L2 vertebral plasty changes are unchanged from November 2023.      ACC: [id number] EXAM:  XR CHEST PORTABLE URGENT 1V   ORDERED BY: FRANCISCO ACEVEDO     PROCEDURE DATE:  01/09/2024          INTERPRETATION:  AP chest on January 9, 2024 at 4:40 AM. Patient has   chest pain.    Heart size is within normal limits.    Lungs are clear.    Chest is similar to CAT scan performed later in the day. There were no   pulmonary emboli.    IMPRESSION: Negative chest.    FARIHA HERNANDEZ MD; Attending Radiologist  This document has been electronically signed. Jan 9 2024  1:31PM            I have personally reviewed the above imaging  [facility]  INFECTIOUS DISEASES   [address] [place] [address]  Tel: [phone]     Fax: [phone]  ==============================================================================  DO Fredi Epstein MD Alexandra Gutman, NP   ==============================================================================    DA BARAKAT  MRN-[id number]  Male  73y ([birth year])        Patient is a 73y old  Male who presents with a chief complaint of Back pain (10 Solitario 2024 22:14)      HPI:  73M PMH BPH, Lumbar compression fracture s/p kyphoplasty in 8/2023 in [place] now presenting with lower back pain, chronic x 3 months, worse over past week.  States he had sustained L2 compression fracture in [place], underwent kyphoplasty with postop course complicated by discitis, s/p antibiotic course through November 2023.  Reports some improvement in pain after first few days, but then recurred.  States he got a second opinion at [place] from a Dr. Steen who insisted he still had infection in the spine and was advised to follow up if he has worsening pain or fevers/chills. Per pt he saw Dr. Slaughter approximately 10 days ago who advised similarly.  Now with 6/10 lower back pain.  Not associated numbness/tingling or weakness. Denies changes in baseline urinary/bowel patterns (has hx of BPH) or saddle anesthesia.  Also c/o lower abdominal pain, ? radiating from back.  No fever / chills / SOB.  Also c/o loose BMs, 3-4 per day over past month.  No additional complaints.     PMH: BPH, Lumbar Discitis  PSH: Lumbar Compression Fracture s/p Kyphoplasty 8/2023.  H/o R elbow fracture.    FAMILY HISTORY: reviewed and negative.  SOCIAL HISTORY: - alcohol, - IVDA, - smoking.  REVIEW OF SYSTEMS: As above.  All remaining ROS are negative.   MEDICATIONS: Flomax 0.4mg daily.    (09 Jan 2024 17:03)    patient received at least 6 weeks of vancomycin and cefepime. [facility] [place] Brought with him 2 MRIs. was diagnosed with typhoid fever and treated in [place]. Concern was after his kyphoplasty that he developed osteomyelitis/discitis. He has no fevers or chills. pain in low back localized without radiation  ID consulted for workup and antibiotic management     PAST MEDICAL & SURGICAL HISTORY:  BPH (benign prostatic hyperplasia)      Previous back surgery          Allergies  No Known Allergies        ANTIMICROBIALS:      MEDICATIONS  (STANDING):    piperacillin/tazobactam IVPB.-   25 mL/Hr IV Intermittent (01-09-24 @ 22:38)    piperacillin/tazobactam IVPB..   25 mL/Hr IV Intermittent (01-10-24 @ 07:27)    piperacillin/tazobactam IVPB...   200 mL/Hr IV Intermittent (01-09-24 @ 15:26)    vancomycin  IVPB.   250 mL/Hr IV Intermittent (01-09-24 @ 15:59)        OTHER MEDS: MEDICATIONS  (STANDING):  acetaminophen     Tablet .. 650 every 6 hours PRN  enoxaparin Injectable 40 every 24 hours  oxycodone    5 mG/acetaminophen 325 mG 2 every 6 hours PRN  tamsulosin 0.4 at bedtime      SOCIAL HISTORY:     Smoking Cigarettes [ ]Active [ ] Former [ x]Denies   ETOH [x ]denies [ ]Former [ ]Current Use denies   Drug Use [ x]Never [ ] Former [ ] Active     FAMILY HISTORY:  FH Diabetes mellitus     REVIEW OF SYSTEMS  [  ] ROS unobtainable because:    [ x ] All other systems negative except as noted below:	    Constitutional:  [ ] fever [ ] chills  [ ] weight loss  [ ] weakness  Skin:  [ ] rash [ ] phlebitis	  Eyes: [ ] icterus [ ] pain  [ ] discharge	  ENMT: [ ] sore throat  [ ] thrush [ ] ulcers [ ] exudates  Respiratory: [ ] dyspnea [ ] hemoptysis [ ] cough [ ] sputum	  Cardiovascular:  [ x] chest pain [ ] palpitations [ ] edema	  Gastrointestinal:  [ ] nausea [ ] vomiting [ ] diarrhea [ ] constipation [ x] pain	  Genitourinary:  [ ] dysuria [ ] frequency [ ] hematuria [ ] discharge [ ] flank pain  [ ] incontinence  Musculoskeletal:  [ ] myalgias [ ] arthralgias [ ] arthritis  [x ] back pain  Neurological:  [ ] headache [ ] seizures  [ ] confusion/altered mental status  Psychiatric:  [ ] anxiety [ ] depression	  Hematology/Lymphatics:  [ ] lymphadenopathy  Endocrine:  [ ] adrenal [ ] thyroid  Allergic/Immunologic:	 [ ] transplant [ ] seasonal    Vital Signs Last 24 Hrs  T(F): 97.8 (01-10-24 @ 23:35), Max: 98.8 (01-10-24 @ 11:40)    Vital Signs Last 24 Hrs  HR: 68 (01-10-24 @ 23:35) (61 - 84)  BP: 151/75 (01-10-24 @ 23:35) (134/79 - 159/91)  RR: 18 (01-10-24 @ 23:35)  SpO2: 97% (01-10-24 @ 23:35) (97% - 98%)  Wt(kg): --    PHYSICAL EXAM:  Constitutional: non-toxic, no distress  HEAD/EYES: anicteric, no conjunctival injection  ENT:  supple, no thrush  Cardiovascular:   normal S1, S2, no murmur, no edema  Respiratory:  clear BS bilaterally, no wheezes, no rales  GI:  soft, non-tender, normal bowel sounds  :  no hernandez, no CVA tenderness  Musculoskeletal:  no synovitis, normal ROM  Neurologic: awake and alert, normal strength, no focal findings  Skin:  no rash, no erythema, no phlebitis  Heme/Onc: no lymphadenopathy   Psychiatric:  awake, alert, appropriate mood          WBC Count: 6.86 K/uL (01-10 @ 05:35)  WBC Count: 7.68 K/uL (01-09 @ 04:16)      Auto Neutrophil %: 53.8 % (01-09-24 @ 04:16)  Auto Neutrophil #: 4.14 K/uL (01-09-24 @ 04:16)                            12.2   6.86  )-----------( 222      ( 10 Solitario 2024 05:35 )             37.0       01-10    137  |  108  |  16  ----------------------------<  153<H>  3.7   |  25  |  0.65    Ca    7.9<L>      10 Solitario 2024 05:35    TPro  7.7  /  Alb  3.3  /  TBili  0.3  /  DBili  x   /  AST  21  /  ALT  35  /  AlkPhos  130<H>  01-09      Creatinine Trend: 0.65<--, 0.69<--                  MICROBIOLOGY:  .Blood Blood-Peripheral  01-09-24   No growth at 24 hours  --  --      .Blood Blood-Peripheral  01-09-24   No growth at 24 hours  --  --      Clean Catch Clean Catch (Midstream)  01-09-24   <10,000 CFU/mL Normal Urogenital Nisha  --  --        SARS-CoV-2: NotDetec (09 Jan 2024 04:16)    HCV 0.43, Nonreact      [01-10-24 @ 05:35]      v    C Diff by PCR Result: NotDetec (01-09 @ 21:38)  Rapid RVP Result: NotDetec (01-09 @ 04:16)      C Diff by PCR Result: NotDetec (01-09-24 @ 21:38)        RADIOLOGY:  < from: MR Lumbar Spine w/wo IV Cont (01.09.24 @ 11:39) >  IMPRESSION:    1. CERVICAL SPINE:   Moderate mid cervical degenerative disc disease   includes multiple disc protrusions (disc herniations) that cause ventral   cord deformity.   No abnormal enhancement.    2. THORACIC SPINE:   Mild thoracic degenerative disc disease. No thoracic   vertebral fracture. No abnormal enhancement.    3. LUMBAR SPINE:   Mild enhancement and fluid within the L1-L2 disc space   is nonspecific, noting endplate changes that are at least in part   long-standing also edema and enhancement within the L1 vertebral body   suggesting an acute process.  Slight L1 superior endplate deformity is   age indeterminate. Septic discitis and L1 vertebral osteomyelitis may   represent active or treated disease, noting vacuum phenomenon within the   L1-L2 disc space on concurrent CT that is typical for disc degeneration   but not typical for septic discitis.  Recommend short interval follow-up   including monitoring of endplate erosive changes best demonstrated by CT.    L2 vertebroplasty.  Mild L4 superior endplate deformity with edema and   enhancement, overall morphology consistent with a Schmorl's node possibly   recent.  Widespread low to intermediate grade lumbar degenerative disc   disease result primarily in foraminal stenosis at the lowest 2   intervertebral disc levels without high-grade central canal stenosis.    Mild kyphotic deformity apex at L1.    4. Recommend direct comparison to previous spine imaging with regard to   the above findings, particularly findings of septic discitis reported on   outside imaging. When these comparison images become available, an   addendum will be provided.    5. See separate body CT report regarding additional findings      MR Lumbar Spine w/wo IV Cont (01.09.24 @ 11:39) >  *** ADDENDUM # 1 ***    Outside MR lumbar without gadolinium enhancement 11/23/2023 images but   not report has become available. The imaging findings at L1 and L2 are   largely unchanged. The extensive marrow edema within L1 may have mildly   decreased. The extent of fluid within the L1-L2 disc space may also be   mildly decreased. The kyphotic deformity is similar. The L4 superior   endplate deformity and edema are an interval finding since November 2023.   The L2 vertebral plasty changes are unchanged from November 2023.      ACC: [id number] EXAM:  XR CHEST PORTABLE URGENT 1V   ORDERED BY: FRANCISCO ACEVEDO     PROCEDURE DATE:  01/09/2024          INTERPRETATION:  AP chest on January 9, 2024 at 4:40 AM. Patient has   chest pain.    Heart size is within normal limits.    Lungs are clear.    Chest is similar to CAT scan performed later in the day. There were no   pulmonary emboli.    IMPRESSION: Negative chest.    FARIHA HERNANDEZ MD; Attending Radiologist  This document has been electronically signed. Jan 9 2024  1:31PM            I have personally reviewed the above imaging

## 2024-01-11 LAB
24R-OH-CALCIDIOL SERPL-MCNC: 29.2 NG/ML — LOW (ref 30–80)
24R-OH-CALCIDIOL SERPL-MCNC: 29.2 NG/ML — LOW (ref 30–80)
ALBUMIN SERPL ELPH-MCNC: 3.3 G/DL — SIGNIFICANT CHANGE UP (ref 3.3–5)
ALBUMIN SERPL ELPH-MCNC: 3.3 G/DL — SIGNIFICANT CHANGE UP (ref 3.3–5)
ALP SERPL-CCNC: 111 U/L — SIGNIFICANT CHANGE UP (ref 40–120)
ALP SERPL-CCNC: 111 U/L — SIGNIFICANT CHANGE UP (ref 40–120)
ALT FLD-CCNC: 25 U/L — SIGNIFICANT CHANGE UP (ref 12–78)
ALT FLD-CCNC: 25 U/L — SIGNIFICANT CHANGE UP (ref 12–78)
ANION GAP SERPL CALC-SCNC: 5 MMOL/L — SIGNIFICANT CHANGE UP (ref 5–17)
ANION GAP SERPL CALC-SCNC: 5 MMOL/L — SIGNIFICANT CHANGE UP (ref 5–17)
AST SERPL-CCNC: 16 U/L — SIGNIFICANT CHANGE UP (ref 15–37)
AST SERPL-CCNC: 16 U/L — SIGNIFICANT CHANGE UP (ref 15–37)
BASOPHILS # BLD AUTO: 0.01 K/UL — SIGNIFICANT CHANGE UP (ref 0–0.2)
BASOPHILS # BLD AUTO: 0.01 K/UL — SIGNIFICANT CHANGE UP (ref 0–0.2)
BASOPHILS NFR BLD AUTO: 0.1 % — SIGNIFICANT CHANGE UP (ref 0–2)
BASOPHILS NFR BLD AUTO: 0.1 % — SIGNIFICANT CHANGE UP (ref 0–2)
BILIRUB SERPL-MCNC: 0.4 MG/DL — SIGNIFICANT CHANGE UP (ref 0.2–1.2)
BILIRUB SERPL-MCNC: 0.4 MG/DL — SIGNIFICANT CHANGE UP (ref 0.2–1.2)
BUN SERPL-MCNC: 12 MG/DL — SIGNIFICANT CHANGE UP (ref 7–23)
BUN SERPL-MCNC: 12 MG/DL — SIGNIFICANT CHANGE UP (ref 7–23)
CALCIUM SERPL-MCNC: 8.3 MG/DL — LOW (ref 8.5–10.1)
CALCIUM SERPL-MCNC: 8.3 MG/DL — LOW (ref 8.5–10.1)
CHLORIDE SERPL-SCNC: 110 MMOL/L — HIGH (ref 96–108)
CHLORIDE SERPL-SCNC: 110 MMOL/L — HIGH (ref 96–108)
CO2 SERPL-SCNC: 24 MMOL/L — SIGNIFICANT CHANGE UP (ref 22–31)
CO2 SERPL-SCNC: 24 MMOL/L — SIGNIFICANT CHANGE UP (ref 22–31)
CREAT SERPL-MCNC: 0.58 MG/DL — SIGNIFICANT CHANGE UP (ref 0.5–1.3)
CREAT SERPL-MCNC: 0.58 MG/DL — SIGNIFICANT CHANGE UP (ref 0.5–1.3)
EGFR: 102 ML/MIN/1.73M2 — SIGNIFICANT CHANGE UP
EGFR: 102 ML/MIN/1.73M2 — SIGNIFICANT CHANGE UP
EOSINOPHIL # BLD AUTO: 0.28 K/UL — SIGNIFICANT CHANGE UP (ref 0–0.5)
EOSINOPHIL # BLD AUTO: 0.28 K/UL — SIGNIFICANT CHANGE UP (ref 0–0.5)
EOSINOPHIL NFR BLD AUTO: 4.1 % — SIGNIFICANT CHANGE UP (ref 0–6)
EOSINOPHIL NFR BLD AUTO: 4.1 % — SIGNIFICANT CHANGE UP (ref 0–6)
FOLATE SERPL-MCNC: 14 NG/ML — SIGNIFICANT CHANGE UP
FOLATE SERPL-MCNC: 14 NG/ML — SIGNIFICANT CHANGE UP
GGT SERPL-CCNC: 22 U/L — SIGNIFICANT CHANGE UP (ref 9–50)
GGT SERPL-CCNC: 22 U/L — SIGNIFICANT CHANGE UP (ref 9–50)
GLUCOSE SERPL-MCNC: 111 MG/DL — HIGH (ref 70–99)
GLUCOSE SERPL-MCNC: 111 MG/DL — HIGH (ref 70–99)
HCT VFR BLD CALC: 37.4 % — LOW (ref 39–50)
HCT VFR BLD CALC: 37.4 % — LOW (ref 39–50)
HGB BLD-MCNC: 12.7 G/DL — LOW (ref 13–17)
HGB BLD-MCNC: 12.7 G/DL — LOW (ref 13–17)
HIV 1+2 AB+HIV1 P24 AG SERPL QL IA: SIGNIFICANT CHANGE UP
HIV 1+2 AB+HIV1 P24 AG SERPL QL IA: SIGNIFICANT CHANGE UP
IMM GRANULOCYTES NFR BLD AUTO: 0.3 % — SIGNIFICANT CHANGE UP (ref 0–0.9)
IMM GRANULOCYTES NFR BLD AUTO: 0.3 % — SIGNIFICANT CHANGE UP (ref 0–0.9)
LYMPHOCYTES # BLD AUTO: 2.32 K/UL — SIGNIFICANT CHANGE UP (ref 1–3.3)
LYMPHOCYTES # BLD AUTO: 2.32 K/UL — SIGNIFICANT CHANGE UP (ref 1–3.3)
LYMPHOCYTES # BLD AUTO: 34 % — SIGNIFICANT CHANGE UP (ref 13–44)
LYMPHOCYTES # BLD AUTO: 34 % — SIGNIFICANT CHANGE UP (ref 13–44)
MCHC RBC-ENTMCNC: 27.9 PG — SIGNIFICANT CHANGE UP (ref 27–34)
MCHC RBC-ENTMCNC: 27.9 PG — SIGNIFICANT CHANGE UP (ref 27–34)
MCHC RBC-ENTMCNC: 34 G/DL — SIGNIFICANT CHANGE UP (ref 32–36)
MCHC RBC-ENTMCNC: 34 G/DL — SIGNIFICANT CHANGE UP (ref 32–36)
MCV RBC AUTO: 82 FL — SIGNIFICANT CHANGE UP (ref 80–100)
MCV RBC AUTO: 82 FL — SIGNIFICANT CHANGE UP (ref 80–100)
MONOCYTES # BLD AUTO: 0.49 K/UL — SIGNIFICANT CHANGE UP (ref 0–0.9)
MONOCYTES # BLD AUTO: 0.49 K/UL — SIGNIFICANT CHANGE UP (ref 0–0.9)
MONOCYTES NFR BLD AUTO: 7.2 % — SIGNIFICANT CHANGE UP (ref 2–14)
MONOCYTES NFR BLD AUTO: 7.2 % — SIGNIFICANT CHANGE UP (ref 2–14)
NEUTROPHILS # BLD AUTO: 3.7 K/UL — SIGNIFICANT CHANGE UP (ref 1.8–7.4)
NEUTROPHILS # BLD AUTO: 3.7 K/UL — SIGNIFICANT CHANGE UP (ref 1.8–7.4)
NEUTROPHILS NFR BLD AUTO: 54.3 % — SIGNIFICANT CHANGE UP (ref 43–77)
NEUTROPHILS NFR BLD AUTO: 54.3 % — SIGNIFICANT CHANGE UP (ref 43–77)
NRBC # BLD: 0 /100 WBCS — SIGNIFICANT CHANGE UP (ref 0–0)
NRBC # BLD: 0 /100 WBCS — SIGNIFICANT CHANGE UP (ref 0–0)
PHOSPHATE SERPL-MCNC: 2.9 MG/DL — SIGNIFICANT CHANGE UP (ref 2.5–4.5)
PHOSPHATE SERPL-MCNC: 2.9 MG/DL — SIGNIFICANT CHANGE UP (ref 2.5–4.5)
PLATELET # BLD AUTO: 250 K/UL — SIGNIFICANT CHANGE UP (ref 150–400)
PLATELET # BLD AUTO: 250 K/UL — SIGNIFICANT CHANGE UP (ref 150–400)
POTASSIUM SERPL-MCNC: 3.6 MMOL/L — SIGNIFICANT CHANGE UP (ref 3.5–5.3)
POTASSIUM SERPL-MCNC: 3.6 MMOL/L — SIGNIFICANT CHANGE UP (ref 3.5–5.3)
POTASSIUM SERPL-SCNC: 3.6 MMOL/L — SIGNIFICANT CHANGE UP (ref 3.5–5.3)
POTASSIUM SERPL-SCNC: 3.6 MMOL/L — SIGNIFICANT CHANGE UP (ref 3.5–5.3)
PROT SERPL-MCNC: 7.1 GM/DL — SIGNIFICANT CHANGE UP (ref 6–8.3)
PROT SERPL-MCNC: 7.1 GM/DL — SIGNIFICANT CHANGE UP (ref 6–8.3)
RBC # BLD: 4.56 M/UL — SIGNIFICANT CHANGE UP (ref 4.2–5.8)
RBC # BLD: 4.56 M/UL — SIGNIFICANT CHANGE UP (ref 4.2–5.8)
RBC # FLD: 12.4 % — SIGNIFICANT CHANGE UP (ref 10.3–14.5)
RBC # FLD: 12.4 % — SIGNIFICANT CHANGE UP (ref 10.3–14.5)
SODIUM SERPL-SCNC: 139 MMOL/L — SIGNIFICANT CHANGE UP (ref 135–145)
SODIUM SERPL-SCNC: 139 MMOL/L — SIGNIFICANT CHANGE UP (ref 135–145)
TESTOST FREE+TOTAL PANEL SERPL-MCNC: 379 NG/DL — SIGNIFICANT CHANGE UP (ref 300–740)
TESTOST FREE+TOTAL PANEL SERPL-MCNC: 379 NG/DL — SIGNIFICANT CHANGE UP (ref 300–740)
TROPONIN I, HIGH SENSITIVITY RESULT: 12.3 NG/L — SIGNIFICANT CHANGE UP
TROPONIN I, HIGH SENSITIVITY RESULT: 12.3 NG/L — SIGNIFICANT CHANGE UP
TSH SERPL-MCNC: 1.67 UIU/ML — SIGNIFICANT CHANGE UP (ref 0.36–3.74)
TSH SERPL-MCNC: 1.67 UIU/ML — SIGNIFICANT CHANGE UP (ref 0.36–3.74)
VIT B12 SERPL-MCNC: 514 PG/ML — SIGNIFICANT CHANGE UP (ref 232–1245)
VIT B12 SERPL-MCNC: 514 PG/ML — SIGNIFICANT CHANGE UP (ref 232–1245)
WBC # BLD: 6.82 K/UL — SIGNIFICANT CHANGE UP (ref 3.8–10.5)
WBC # BLD: 6.82 K/UL — SIGNIFICANT CHANGE UP (ref 3.8–10.5)
WBC # FLD AUTO: 6.82 K/UL — SIGNIFICANT CHANGE UP (ref 3.8–10.5)
WBC # FLD AUTO: 6.82 K/UL — SIGNIFICANT CHANGE UP (ref 3.8–10.5)

## 2024-01-11 PROCEDURE — 99232 SBSQ HOSP IP/OBS MODERATE 35: CPT

## 2024-01-11 RX ADMIN — TAMSULOSIN HYDROCHLORIDE 0.4 MILLIGRAM(S): 0.4 CAPSULE ORAL at 22:14

## 2024-01-11 RX ADMIN — ENOXAPARIN SODIUM 40 MILLIGRAM(S): 100 INJECTION SUBCUTANEOUS at 22:14

## 2024-01-11 NOTE — PROGRESS NOTE ADULT - ASSESSMENT
73M PMH BPH, Lumbar compression fracture s/p kyphoplasty in 8/2023 in [place] now presenting with lower back pain, chronic x 3 months, worse over past week.  States he had sustained L2 compression fracture in [place], underwent kyphoplasty with postop course complicated by discitis, s/p antibiotic course through December.   Has no fevers, no chills  normal wbc count and normal CRP   I reviewed the previous MRIs along with the latest one just done with neuroradiologist: there is improvement in the paravertebral inflammation and phlegmon. What was seen on L1 and L2 prior is drastically improved. There is a new L4 compression fracture.   Given these findings and the prolonged course of antibiotics he already received I would monitor him off antibiotics   If there is a true infection it will be helpful to identify the organism(s) involved. Had biopsy with culture in [place] which was negative   Does not appear to be Andrea disease but can present as extrapulmonary TB (no need for isolation)   He new pain is likely from his new compression fracture -etiology should be looked into     1/11: no fevers, RA, no leukocytosis, Cr and LFTs ok, BCs x 2 NGTD, UC no growth, C. Diff negative, pt is not having diarrhea, chest pain resolved, pt is being monitored off abx.     Plan:  continue monitoring off abx   follow BCs x 2 - NGTD  QuantiFeron - ordered    HIV - pending   pain control   physical therapy   trend ESR and CRP  monitor for fevers   please workup his left sided chest pain - pt denied having pain in his chest today  should have a DEXA scan outpatient   can repeat MRI in 6 weeks outpatient     will discuss with Dr. Harris  73M PMH BPH, Lumbar compression fracture s/p kyphoplasty in 8/2023 in [place] now presenting with lower back pain, chronic x 3 months, worse over past week.  States he had sustained L2 compression fracture in [place], underwent kyphoplasty with postop course complicated by discitis, s/p antibiotic course through December.   Has no fevers, no chills  normal wbc count and normal CRP   I reviewed the previous MRIs along with the latest one just done with neuroradiologist: there is improvement in the paravertebral inflammation and phlegmon. What was seen on L1 and L2 prior is drastically improved. There is a new L4 compression fracture.   Given these findings and the prolonged course of antibiotics he already received I would monitor him off antibiotics   If there is a true infection it will be helpful to identify the organism(s) involved. Had biopsy with culture in [place] which was negative   Does not appear to be Andrea disease but can present as extrapulmonary TB (no need for isolation)   He new pain is likely from his new compression fracture -etiology should be looked into     1/11: no fevers, RA, no leukocytosis, Cr and LFTs ok, BCs x 2 NGTD, UC no growth, C. Diff negative, pt is not having diarrhea, chest pain resolved, pt is being monitored off abx.     Plan:  continue monitoring off abx   follow BCs x 2 - NGTD  QuantiFeron - ordered    HIV - pending   pain control   physical therapy   trend ESR and CRP  monitor for fevers   please workup his left sided chest pain - pt denied having pain in his chest today  should have a DEXA scan outpatient   can repeat MRI in 6 weeks outpatient     will sign off, re-consult as needed     discussed with Dr. Harris   msg sent to Dr. Mohamud  73M PMH BPH, Lumbar compression fracture s/p kyphoplasty in 8/2023 in [place] now presenting with lower back pain, chronic x 3 months, worse over past week.  States he had sustained L2 compression fracture in [place], underwent kyphoplasty with postop course complicated by discitis, s/p antibiotic course through December.   Has no fevers, no chills  normal wbc count and normal CRP   I reviewed the previous MRIs along with the latest one just done with neuroradiologist: there is improvement in the paravertebral inflammation and phlegmon. What was seen on L1 and L2 prior is drastically improved. There is a new L4 compression fracture.   Given these findings and the prolonged course of antibiotics he already received I would monitor him off antibiotics   If there is a true infection it will be helpful to identify the organism(s) involved. Had biopsy with culture in [place] which was negative   Does not appear to be Andrea disease but can present as extrapulmonary TB (no need for isolation)   His new pain is likely from his new compression fracture -etiology should be looked into     1/11: no fevers, RA, no leukocytosis, Cr and LFTs ok, BCs x 2 NGTD, UC no growth, C. Diff negative, pt is not having diarrhea, chest pain resolved, pt is being monitored off abx.     Plan:  continue monitoring off abx   follow BCs x 2 - NGTD  QuantiFeron - ordered    HIV - pending   pain control   physical therapy   trend ESR and CRP  monitor for fevers   please workup his left sided chest pain - pt denied having pain in his chest today  should have a DEXA scan outpatient   can repeat MRI in 6 weeks outpatient     will sign off, re-consult as needed     discussed with Dr. Harris   msg sent to Dr. Mohamud

## 2024-01-11 NOTE — PHYSICAL THERAPY INITIAL EVALUATION ADULT - GAIT TRAINING, PT EVAL
Pt will ambulate 200 ft using least restrictive assistive device with proper hand/feet placement, proper sequencing and proper body mechanics within 2 weeks

## 2024-01-11 NOTE — PROGRESS NOTE ADULT - SUBJECTIVE AND OBJECTIVE BOX
CHIEF COMPLAINT: Low back pain 5/10   no chest pain   lower abd pain but no diarrhea or vomiting or constipation reported. no dysuria.         PHYSICAL EXAM:    GENERAL: Moderately built, no acute distress   CHEST/LUNG:  No wheezing, no crackles   HEART: Regular rate and rhythm; No murmurs  ABDOMEN: Soft, Nontender, Nondistended; Bowel sounds present  EXTREMITIES:  No clubbing, cyanosis, or jorge luis  Psychiatry: AAO x 3, mood is appropriate       OBJECTIVE DATA:     Vital Signs Last 24 Hrs  T(C): 36.4 (2024 04:32), Max: 36.6 (10 Solitario 2024 23:35)  T(F): 97.5 (2024 04:32), Max: 97.8 (10 Solitario 2024 23:35)  HR: 80 (:32) (68 - 80)  BP: 131/86 (2024 04:32) (131/86 - 159/91)  BP(mean): --  RR: 18 (:32) (18 - 18)  SpO2: 97% (2024 04:32) (97% - 97%)               Daily     Daily Weight in k.5 (2024 04:32)  LABS:                        12.7   6.82  )-----------( 250      ( 2024 07:25 )             37.4                 139  |  110<H>  |  12  ----------------------------<  111<H>  3.6   |  24  |  0.58    Ca    8.3<L>      2024 07:25  Phos  2.9         TPro  7.1  /  Alb  3.3  /  TBili  0.4  /  DBili  x   /  AST  16  /  ALT  25  /  AlkPhos  111  11                Urinalysis Basic - ( 2024 07:25 )    Color: x / Appearance: x / SG: x / pH: x  Gluc: 111 mg/dL / Ketone: x  / Bili: x / Urobili: x   Blood: x / Protein: x / Nitrite: x   Leuk Esterase: x / RBC: x / WBC x   Sq Epi: x / Non Sq Epi: x / Bacteria: x           CAPILLARY BLOOD GLUCOSE      POCT Blood Glucose.: 112 mg/dL (10 Solitario 2024 16:05)      Culture - Blood (collected )  Source: .Blood Blood-Peripheral  Preliminary Report (01-10):    No growth at 24 hours    Culture - Blood (collected )  Source: .Blood Blood-Peripheral  Preliminary Report (01-10):    No growth at 24 hours    Culture - Urine (collected )  Source: Clean Catch Clean Catch (Midstream)  Final Report (01-10):    <10,000 CFU/mL Normal Urogenital Nisha          Interval Radiology studies: reviewed     < from: CT Angio Chest PE Protocol w/ IV Cont (24 @ 11:49) >    IMPRESSION:  No evidence of pulmonary emboli or other acute finding    < end of copied text >    MEDICATIONS  (STANDING):  enoxaparin Injectable 40 milliGRAM(s) SubCutaneous every 24 hours  tamsulosin 0.4 milliGRAM(s) Oral at bedtime    MEDICATIONS  (PRN):  acetaminophen     Tablet .. 650 milliGRAM(s) Oral every 6 hours PRN Mild Pain (1 - 3), Moderate Pain (4 - 6)  oxycodone    5 mG/acetaminophen 325 mG 2 Tablet(s) Oral every 6 hours PRN Severe Pain (7 - 10)

## 2024-01-11 NOTE — CHART NOTE - NSCHARTNOTEFT_GEN_A_CORE
Pt seen and examined by Dr. Slaughter. Pt is clinically improving. per ID, no need for further antibiotics. Ortho stable for DC. Can wear brace as needed while ambulating. See Dr. Slaughter in the office within 2-3 weeks, call for appointment.

## 2024-01-11 NOTE — PROGRESS NOTE ADULT - ASSESSMENT
73M PMH BPH, Lumbar compression fracture s/p kyphoplasty in 8/2023 in [place] now presenting with lower back pain, chronic x 3 months, worse over past week.    # Back Pain, Suspected L1 Discitis / Osteomyelitis is ruled out. Back pain Likely L4 superior endplate deformity. I have consulted and discussed with ID Dr Harris. Patient has low ESR, CRP and has finished 6 weeks of iv antibiotics. MRI spine likely showing resolved previous infection. Blood cultures no growth. PT eval pending. outpatient ortho follow up for interval MR lumbar spine and DEXA scan.   # Abdominal Pain / Diarrhea- unspecified. cont to monitor.   # BPH - continue Flomax.  # Inpatient DVT Prophylaxis - Cont lovenox.     Full code

## 2024-01-11 NOTE — PHYSICAL THERAPY INITIAL EVALUATION ADULT - PERTINENT HX OF CURRENT PROBLEM, REHAB EVAL
73M PMH BPH, Lumbar compression fracture s/p kyphoplasty in 8/2023 in [place] now presenting with lower back pain, chronic x 3 months, worse over past week. Suspected L1 Discitis / Osteomyelitis is ruled out.

## 2024-01-11 NOTE — PROGRESS NOTE ADULT - SUBJECTIVE AND OBJECTIVE BOX
DA BARAKAT  MRN-[id number]    Follow Up: recent history of discitis     Interval History: the pt was seen and examined earlier, not in acute distress, eating his lunch, no new complaints. Pt is afebrile, RA, no leukocytosis. Pt admits to some limitations due to recent treated discitis, states that his chest pain is no longer there, no abd pain, denies having N/V or diarrhea, denies any urinary symptoms.     PAST MEDICAL & SURGICAL HISTORY:  BPH (benign prostatic hyperplasia)      Previous back surgery          ROS:    [ ] Unobtainable because:  [x ] All other systems negative    Constitutional: no fever, no chills  Head: no trauma  Eyes: no vision changes, no eye pain  ENT:  no sore throat, no rhinorrhea  Cardiovascular:  no chest pain, no palpitation  Respiratory:  no SOB, no cough  GI:  no abd pain, no vomiting, no diarrhea  urinary: no dysuria, no hematuria, no flank pain  musculoskeletal:  chronic back pain, controlled   skin:  no rash  neurology:  no headache, no seizure, no change in mental status  psych: no anxiety, no depression         Allergies  No Known Allergies        ANTIMICROBIALS:      OTHER MEDS:  acetaminophen     Tablet .. 650 milliGRAM(s) Oral every 6 hours PRN  enoxaparin Injectable 40 milliGRAM(s) SubCutaneous every 24 hours  oxycodone    5 mG/acetaminophen 325 mG 2 Tablet(s) Oral every 6 hours PRN  tamsulosin 0.4 milliGRAM(s) Oral at bedtime      Vital Signs Last 24 Hrs  T(C): 37.3 (11 Jan 2024 11:54), Max: 37.3 (11 Jan 2024 11:54)  T(F): 99.1 (11 Jan 2024 11:54), Max: 99.1 (11 Jan 2024 11:54)  HR: 69 (11 Jan 2024 11:54) (68 - 80)  BP: 103/65 (11 Jan 2024 11:54) (103/65 - 159/91)  BP(mean): --  RR: 17 (11 Jan 2024 11:54) (17 - 18)  SpO2: 94% (11 Jan 2024 11:54) (94% - 97%)    Parameters below as of 11 Jan 2024 11:54  Patient On (Oxygen Delivery Method): room air        Physical Exam:  Constitutional: non-toxic, no distress, RA  HEAD/EYES: anicteric, no conjunctival injection  ENT:  supple, no thrush  Cardiovascular:   normal S1, S2, no murmur, no edema  Respiratory:  clear BS bilaterally, no wheezes, no rales  GI:  soft, non-tender, normal bowel sounds  :  no hernandez, no CVA tenderness  Musculoskeletal:  no synovitis, normal ROM  Neurologic: awake and alert, normal strength, no focal findings  Skin:  no rash, no erythema, no phlebitis  Heme/Onc: no lymphadenopathy   Psychiatric:  awake, alert, appropriate mood    WBC Count: 6.82 K/uL (01-11 @ 07:25)  WBC Count: 6.86 K/uL (01-10 @ 05:35)  WBC Count: 7.68 K/uL (01-09 @ 04:16)                            12.7   6.82  )-----------( 250      ( 11 Jan 2024 07:25 )             37.4       01-11    139  |  110<H>  |  12  ----------------------------<  111<H>  3.6   |  24  |  0.58    Ca    8.3<L>      11 Jan 2024 07:25  Phos  2.9     01-11    TPro  7.1  /  Alb  3.3  /  TBili  0.4  /  DBili  x   /  AST  16  /  ALT  25  /  AlkPhos  111  01-11      Urinalysis Basic - ( 11 Jan 2024 07:25 )    Color: x / Appearance: x / SG: x / pH: x  Gluc: 111 mg/dL / Ketone: x  / Bili: x / Urobili: x   Blood: x / Protein: x / Nitrite: x   Leuk Esterase: x / RBC: x / WBC x   Sq Epi: x / Non Sq Epi: x / Bacteria: x        Creatinine Trend: 0.58<--, 0.65<--, 0.69<--      MICROBIOLOGY:  v  .Blood Blood-Peripheral  01-09-24   No growth at 24 hours  --  --      .Blood Blood-Peripheral  01-09-24   No growth at 24 hours  --  --      Clean Catch Clean Catch (Midstream)  01-09-24   <10,000 CFU/mL Normal Urogenital Nisha  --  --          C Diff by PCR Result: Lamin (01-09 @ 21:38)  Rapid RVP Result: NotDetec (01-09 @ 04:16)    C Diff by PCR Result: NotDetec (01-09-24 @ 21:38)      C-Reactive Protein, Serum: <3 (01-09)            SARS-CoV-2: NotDetec (01-09-24 @ 04:16)  Rapid RVP Result: NotDetec (01-09-24 @ 04:16)    SARS-CoV-2: NotDetec (09 Jan 2024 04:16)    RADIOLOGY:     DA BARAKAT  MRN-[id number]    Follow Up: recent history of discitis     Interval History: the pt was seen and examined earlier, not in acute distress, eating his lunch, no new complaints. Pt is afebrile, RA, no leukocytosis. Pt admits to some limitations due to recent treated discitis, states that his chest pain is no longer there, no abd pain, denies having N/V or diarrhea, denies any urinary symptoms.     PAST MEDICAL & SURGICAL HISTORY:  BPH (benign prostatic hyperplasia)      Previous back surgery          ROS:    [ ] Unobtainable because:  [x ] All other systems negative    Constitutional: no fever, no chills  Head: no trauma  Eyes: no vision changes, no eye pain  ENT:  no sore throat, no rhinorrhea  Cardiovascular:  no chest pain, no palpitation  Respiratory:  no SOB, no cough  GI:  no abd pain, no vomiting, no diarrhea  urinary: no dysuria, no hematuria, no flank pain  musculoskeletal:  chronic back pain, controlled   skin:  no rash  neurology:  no headache, no seizure, no change in mental status  psych: no anxiety, no depression         Allergies  No Known Allergies        ANTIMICROBIALS:      OTHER MEDS:  acetaminophen     Tablet .. 650 milliGRAM(s) Oral every 6 hours PRN  enoxaparin Injectable 40 milliGRAM(s) SubCutaneous every 24 hours  oxycodone    5 mG/acetaminophen 325 mG 2 Tablet(s) Oral every 6 hours PRN  tamsulosin 0.4 milliGRAM(s) Oral at bedtime      Vital Signs Last 24 Hrs  T(C): 37.3 (11 Jan 2024 11:54), Max: 37.3 (11 Jan 2024 11:54)  T(F): 99.1 (11 Jan 2024 11:54), Max: 99.1 (11 Jan 2024 11:54)  HR: 69 (11 Jan 2024 11:54) (68 - 80)  BP: 103/65 (11 Jan 2024 11:54) (103/65 - 159/91)  BP(mean): --  RR: 17 (11 Jan 2024 11:54) (17 - 18)  SpO2: 94% (11 Jan 2024 11:54) (94% - 97%)    Parameters below as of 11 Jan 2024 11:54  Patient On (Oxygen Delivery Method): room air        Physical Exam:  Constitutional: non-toxic, no distress, RA  HEAD/EYES: anicteric, no conjunctival injection  ENT:  supple, no thrush  Cardiovascular:   normal S1, S2, no murmur, no edema  Respiratory:  clear BS bilaterally, no wheezes, no rales  GI:  soft, non-tender, normal bowel sounds  :  no hernandez, no CVA tenderness  Musculoskeletal:  no synovitis, normal ROM  Neurologic: awake and alert, normal strength, no focal findings  Skin:  no rash, no erythema, no phlebitis  Heme/Onc: no lymphadenopathy   Psychiatric:  awake, alert, appropriate mood    WBC Count: 6.82 K/uL (01-11 @ 07:25)  WBC Count: 6.86 K/uL (01-10 @ 05:35)  WBC Count: 7.68 K/uL (01-09 @ 04:16)                            12.7   6.82  )-----------( 250      ( 11 Jan 2024 07:25 )             37.4       01-11    139  |  110<H>  |  12  ----------------------------<  111<H>  3.6   |  24  |  0.58    Ca    8.3<L>      11 Jan 2024 07:25  Phos  2.9     01-11    TPro  7.1  /  Alb  3.3  /  TBili  0.4  /  DBili  x   /  AST  16  /  ALT  25  /  AlkPhos  111  01-11      Urinalysis Basic - ( 11 Jan 2024 07:25 )    Color: x / Appearance: x / SG: x / pH: x  Gluc: 111 mg/dL / Ketone: x  / Bili: x / Urobili: x   Blood: x / Protein: x / Nitrite: x   Leuk Esterase: x / RBC: x / WBC x   Sq Epi: x / Non Sq Epi: x / Bacteria: x        Creatinine Trend: 0.58<--, 0.65<--, 0.69<--      MICROBIOLOGY:  v  .Blood Blood-Peripheral  01-09-24   No growth at 24 hours  --  --      .Blood Blood-Peripheral  01-09-24   No growth at 24 hours  --  --      Clean Catch Clean Catch (Midstream)  01-09-24   <10,000 CFU/mL Normal Urogenital Nisha  --  --          C Diff by PCR Result: Lamin (01-09 @ 21:38)  Rapid RVP Result: NotDetec (01-09 @ 04:16)    C Diff by PCR Result: NotDetec (01-09-24 @ 21:38)      C-Reactive Protein, Serum: <3 (01-09)            SARS-CoV-2: NotDetec (01-09-24 @ 04:16)  Rapid RVP Result: NotDetec (01-09-24 @ 04:16)    SARS-CoV-2: NotDetec (09 Jan 2024 04:16)    RADIOLOGY:

## 2024-01-11 NOTE — PHYSICAL THERAPY INITIAL EVALUATION ADULT - GENERAL OBSERVATIONS, REHAB EVAL
Pt encountered in supine, HOB elevated. Pt AOx4, NAD, able to follow multi-step instructions 100% of the time

## 2024-01-11 NOTE — CHART NOTE - NSCHARTNOTEFT_GEN_A_CORE
Patient seen for MST score 2 or > ( unsure of weight loss)  nutrition risk rescreening. Patient has been screened for and presents negative for    -Pressure ulcers stage 2 or greater  -Enteral or Parenteral Nutrition  -BMI <18  -DaujdsmbmjX4Z >8  -Chewing/Swallowing Difficulty  -Decreased appetite  -Unintentional Weight Loss  -Inadequate diet (i.e. NPO/Clear Liquids)    No intervention required at this time. RD remains available. Pt is Consult received for "MST Score = />2." Upon chart review, patient with stable weight, does not currently meet criteria for Protein Calorie Malnutrition risk per MST. RD remains available for assessment per protocol or as needed.  Seen on medical floor, adm w/ back pain. Denies N/V/D/C/Chewing/Swallowing issues, No food allergies. Consumed 100 % lunch meal. No c/o at this time. Pt w/ many food preferences , conveyed to diet office. Patient seen for MST score 2 or > ( unsure of weight loss)  nutrition risk rescreening. Patient has been screened for and presents negative for    -Pressure ulcers stage 2 or greater  -Enteral or Parenteral Nutrition  -BMI <18  -UxcczddayjP6L >8  -Chewing/Swallowing Difficulty  -Decreased appetite  -Unintentional Weight Loss  -Inadequate diet (i.e. NPO/Clear Liquids)    No intervention required at this time. RD remains available. Pt is Consult received for "MST Score = />2." Upon chart review, patient with stable weight, does not currently meet criteria for Protein Calorie Malnutrition risk per MST. RD remains available for assessment per protocol or as needed.  Seen on medical floor, adm w/ back pain. Denies N/V/D/C/Chewing/Swallowing issues, No food allergies. Consumed 100 % lunch meal. No c/o at this time. Pt w/ many food preferences , conveyed to diet office.

## 2024-01-11 NOTE — PHYSICAL THERAPY INITIAL EVALUATION ADULT - MODALITIES TREATMENT COMMENTS
+ Seated flexion test R PSIS slightly higher than L with c/o back pain (6-7/10) possible SI joint dysfunction

## 2024-01-11 NOTE — PHYSICAL THERAPY INITIAL EVALUATION ADULT - ADDITIONAL COMMENTS
Pt lives with roommate in a private house with direct access to basement, no steps to enter. Once inside, bedroom and bathroom are on the same floor. Bathroom is a tub/shower combination, standard height toilet. Pt reported that prior to admission pt is a community ambulator with quadcane. Pt also reported receiving PT from outpatient that is 1 block away from his house.

## 2024-01-11 NOTE — PHYSICAL THERAPY INITIAL EVALUATION ADULT - TRANSFER TRAINING, PT EVAL
Pt will perform functional transfers independently with proper hand/feet placement, proper sequencing and proper body mechanics within 2 weeks

## 2024-01-11 NOTE — PROGRESS NOTE ADULT - NS ATTEND AMEND GEN_ALL_CORE FT
I have reviewed all pertinent clinical information and agree with the NP's note.   continue to monitor off antibiotics   ortho to fit back brace   pain control   Physical and occupational therapy   follow up remaining pending labs     Will sign off. Please call with questions.     Ricardo Harris, DO  Infectious Disease Attending  Reachable via Microsoft Teams or ID office: [phone]  After 5pm/weekends please call [phone] for all inquiries and new consult(s) I have reviewed all pertinent clinical information and agree with the NP's note.   continue to monitor off antibiotics   ortho to fit back brace   pain control   Physical and occupational therapy   follow up remaining pending labs     Will sign off. Please call with questions.     Ricardo Harris, DO  Infectious Disease Attending  Reachable via Microsoft Teams or ID office: [phone]  After 5pm/weekends please call [phone] for all inquiries and new consult(s)

## 2024-01-12 VITALS — DIASTOLIC BLOOD PRESSURE: 80 MMHG | SYSTOLIC BLOOD PRESSURE: 140 MMHG

## 2024-01-12 PROCEDURE — 99239 HOSP IP/OBS DSCHRG MGMT >30: CPT

## 2024-01-12 RX ORDER — NALOXONE HYDROCHLORIDE 4 MG/.1ML
4 SPRAY NASAL
Qty: 1 | Refills: 0
Start: 2024-01-12

## 2024-01-12 RX ORDER — CHOLECALCIFEROL (VITAMIN D3) 125 MCG
1 CAPSULE ORAL
Qty: 30 | Refills: 0
Start: 2024-01-12 | End: 2024-02-10

## 2024-01-12 RX ORDER — OXYCODONE AND ACETAMINOPHEN 5; 325 MG/1; MG/1
1 TABLET ORAL
Qty: 20 | Refills: 0
Start: 2024-01-12 | End: 2024-01-16

## 2024-01-12 NOTE — DISCHARGE NOTE PROVIDER - CARE PROVIDER_API CALL
your PCP in a week,   Phone: (   )    -  Fax: (   )    -  Follow Up Time:     Kamran Slaughter  Orthopaedic Surgery  [facility], [address] [address]  Phone: [phone]  Fax: [phone]  Follow Up Time: 2 weeks   your PCP in a week,   Phone: (   )    -  Fax: (   )    -  Follow Up Time:     Kamran Slaughter  Orthopaedic Surgery  [facility], [address] [address]  Phone: [phone]  Fax: [phone]  Follow Up Time: 2 weeks

## 2024-01-12 NOTE — DISCHARGE NOTE PROVIDER - CARE PROVIDERS DIRECT ADDRESSES
,DirectAddress_Unknown,[email or web address] ,DirectAddress_Unknown,[email or web address]

## 2024-01-12 NOTE — DISCHARGE NOTE PROVIDER - NSDCFUADDINST_GEN_ALL_CORE_FT
1) It is important to see your primary physician as well as other necessary consultants within next 7-10 days to perform a comprehensive medical review.  Call their offices for an appointment as soon as you leave the hospital.  If you do not have a primary physician or unable to reach your PCP, contact the [facility] Physician Referral Service at [phone].  Your medical issues appear to be stable at this time, but if your symptoms recur or worsen, contact your physicians and/or return to the hospital if necessary.  If you encounter any issues or questions with your medication, call your physicians before stopping the medication.    2) Please access [facility] Patient portal (as instructed on the discharge paperwork) to access your medical records at any time after discharge.     3) Please come back or call ortho/PCP if any worsening/new low back pain or any fever or focal weakness develops.  1) It is important to see your primary physician as well as other necessary consultants within next 7-10 days to perform a comprehensive medical review.  Call their offices for an appointment as soon as you leave the hospital.  If you do not have a primary physician or unable to reach your PCP, contact the [facility] Physician Referral Service at [phone].  Your medical issues appear to be stable at this time, but if your symptoms recur or worsen, contact your physicians and/or return to the hospital if necessary.  If you encounter any issues or questions with your medication, call your physicians before stopping the medication.    2) Please access [facility] Patient portal (as instructed on the discharge paperwork) to access your medical records at any time after discharge.     3) Please come back or call ortho/PCP if any worsening/new low back pain or any fever or focal weakness develops.

## 2024-01-12 NOTE — DISCHARGE NOTE PROVIDER - NSDCMRMEDTOKEN_GEN_ALL_CORE_FT
Flomax 0.4 mg oral capsule: 1 cap(s) orally once a day  outpatient PT: outpatient PT 2-3 x/week x 4-6 weeks  oxycodone-acetaminophen 5 mg-325 mg oral tablet: 1 tab(s) orally every 6 hours as needed for Severe Pain (7 - 10) MDD: 4 tabs   cholecalciferol 125 mcg (5000 intl units) oral tablet: 1 tab(s) orally once a day  Flomax 0.4 mg oral capsule: 1 cap(s) orally once a day  Narcan 4 mg/0.1 mL nasal spray: 4 milligram(s) intranasally once MDD: 4mg  oxycodone-acetaminophen 5 mg-325 mg oral tablet: 1 tab(s) orally every 6 hours as needed for Severe Pain (7 - 10) MDD: 4 tabs

## 2024-01-12 NOTE — DISCHARGE NOTE PROVIDER - HOSPITAL COURSE
73M PMH BPH, Lumbar compression fracture s/p kyphoplasty in 8/2023 in [place] now presenting with lower back pain, chronic x 3 months, worse over past week.    # Low Back Pain. L1 Discitis / Osteomyelitis is ruled out. Back pain Likely L4 superior endplate deformity (compression fracture). I have consulted and discussed with ID Dr Harris. Patient has low ESR, CRP and has finished 6 weeks of iv antibiotics. MRI spine likely showing resolved previous infection. Blood cultures no growth. PT eval recommended outpatient PT. outpatient ortho follow up for interval MR lumbar spine and DEXA scan.   # Abdominal Pain / Diarrhea- unspecified. cont to monitor.   Chest pain unspecified. not cardiac. resolved.   # BPH - continue Flomax.    Full code   Seen and examined by me today. Vitals stable.   I have discussed all the inpatient radiographic findings with the patient and stressed that patient follows with the PCP for further outpatient care. I have educated patient to use [employer] patient portal (as instructed on the discharge paperwork) to access medical records outside the hospital.   All questions welcomed and answered appropriately. Patient verbalized understanding of post discharge physician's follows up and discharge instructions.   DC time spent by me face to face excluding billable procedures 38 mins           73M PMH BPH, Lumbar compression fracture s/p kyphoplasty in 8/2023 in [place] now presenting with lower back pain, chronic x 3 months, worse over past week.    # Low Back Pain. L1 Discitis / Osteomyelitis is ruled out. Back pain Likely L4 superior endplate deformity (compression fracture). I have consulted and discussed with ID Dr Harris. Patient has low ESR, CRP and has finished 6 weeks of iv antibiotics. MRI spine likely showing resolved previous infection. Blood cultures no growth. PT eval recommended outpatient PT. outpatient ortho follow up for interval MR lumbar spine and DEXA scan.   # Abdominal Pain / Diarrhea- unspecified. cont to monitor.   Chest pain unspecified. not cardiac. resolved.   # BPH - continue Flomax.    Full code   Seen and examined by me today. Vitals stable.   I have discussed all the inpatient radiographic findings with the patient and stressed that patient follows with the PCP for further outpatient care. I have educated patient to use [employer] patient portal (as instructed on the discharge paperwork) to access medical records outside the hospital.   All questions welcomed and answered appropriately. Patient verbalized understanding of post discharge physician's follows up and discharge instructions.   DC time spent by me face to face excluding billable procedures 38 mins           73M PMH BPH, Lumbar compression fracture s/p kyphoplasty in 8/2023 in [place] now presenting with lower back pain, chronic x 3 months, worse over past week.    # Low Back Pain. L1 Discitis / Osteomyelitis is ruled out. Back pain Likely L4 superior endplate deformity (compression fracture). I have consulted and discussed with ID Dr Harris. Patient has low ESR, CRP and has finished 6 weeks of iv antibiotics. MRI spine likely showing resolved previous infection. Blood cultures no growth. PT eval reviewed.  outpatient ortho follow up for interval MR lumbar spine and DEXA scan.   # Abdominal Pain / Diarrhea- unspecified. cont to monitor.   Chest pain unspecified. not cardiac. resolved.   # BPH - continue Flomax.    Full code   Seen and examined by me today. Vitals stable.   I have discussed all the inpatient radiographic findings with the patient and stressed that patient follows with the PCP for further outpatient care. I have educated patient to use [facility] patient portal (as instructed on the discharge paperwork) to access medical records outside the hospital.   All questions welcomed and answered appropriately. Patient verbalized understanding of post discharge physician's follows up and discharge instructions.   DC time spent by me face to face excluding billable procedures 38 mins           73M PMH BPH, Lumbar compression fracture s/p kyphoplasty in 8/2023 in [place] now presenting with lower back pain, chronic x 3 months, worse over past week.    # Low Back Pain. L1 Discitis / Osteomyelitis is ruled out. Back pain Likely L4 superior endplate deformity (compression fracture). I have consulted and discussed with ID Dr Harris. Patient has low ESR, CRP and has finished 6 weeks of iv antibiotics. MRI spine likely showing resolved previous infection. Blood cultures no growth. PT eval reviewed.  outpatient ortho follow up for interval MR lumbar spine and DEXA scan.   # Abdominal Pain / Diarrhea- unspecified. cont to monitor.   Chest pain unspecified. not cardiac. resolved.   # BPH - continue Flomax.    Full code   Seen and examined by me today. Vitals stable.   I have discussed all the inpatient radiographic findings with the patient and stressed that patient follows with the PCP for further outpatient care. I have educated patient to use [facility] patient portal (as instructed on the discharge paperwork) to access medical records outside the hospital.   All questions welcomed and answered appropriately. Patient verbalized understanding of post discharge physician's follows up and discharge instructions.   DC time spent by me face to face excluding billable procedures 38 mins

## 2024-01-12 NOTE — DISCHARGE NOTE NURSING/CASE MANAGEMENT/SOCIAL WORK - NSDCPEFALRISK_GEN_ALL_CORE
For information on Fall & Injury Prevention, visit: [email or web address] OR  [email or web address] OR  https://www.cdc.gov/steadi/patient.html For information on Fall & Injury Prevention, visit: [email or web address] OR  [email or web address] OR  https://www.cdc.gov/steadi/patient.html

## 2024-01-12 NOTE — DISCHARGE NOTE NURSING/CASE MANAGEMENT/SOCIAL WORK - PATIENT PORTAL LINK FT
You can access the FollowMyHealth Patient Portal offered by [facility] by registering at the following website: [email or web address]. By joining [employer]’s FollowMyHealth portal, you will also be able to view your health information using other applications (apps) compatible with our system. You can access the FollowMyHealth Patient Portal offered by [facility] by registering at the following website: [email or web address]. By joining [employer]’s FollowMyHealth portal, you will also be able to view your health information using other applications (apps) compatible with our system.

## 2024-01-12 NOTE — DISCHARGE NOTE PROVIDER - PROVIDER TOKENS
FREE:[LAST:[your PCP in a week],PHONE:[(   )    -],FAX:[(   )    -]],PROVIDER:[TOKEN:[[id number]],FOLLOWUP:[2 weeks]] FREE:[LAST:[your PCP in a week],PHONE:[(   )    -],FAX:[(   )    -]],PROVIDER:[TOKEN:[[id number]],FOLLOWUP:[2 weeks]]

## 2024-01-13 LAB
TESTOST FREE SERPL-MCNC: 4 PG/ML — LOW (ref 5.9–27)
TESTOST FREE SERPL-MCNC: 4 PG/ML — LOW (ref 5.9–27)

## 2024-01-14 LAB
CULTURE RESULTS: SIGNIFICANT CHANGE UP
SPECIMEN SOURCE: SIGNIFICANT CHANGE UP

## 2024-01-16 LAB
GAMMA INTERFERON BACKGROUND BLD IA-ACNC: 0.01 IU/ML — SIGNIFICANT CHANGE UP
M TB IFN-G BLD-IMP: NEGATIVE — SIGNIFICANT CHANGE UP
M TB IFN-G CD4+ BCKGRND COR BLD-ACNC: 0.01 IU/ML — SIGNIFICANT CHANGE UP
M TB IFN-G CD4+CD8+ BCKGRND COR BLD-ACNC: 0.01 IU/ML — SIGNIFICANT CHANGE UP
QUANT TB PLUS MITOGEN MINUS NIL: >10 IU/ML — SIGNIFICANT CHANGE UP

## 2024-01-22 DIAGNOSIS — M46.46 DISCITIS, UNSPECIFIED, LUMBAR REGION: ICD-10-CM

## 2024-01-22 DIAGNOSIS — Z91.81 HISTORY OF FALLING: ICD-10-CM

## 2024-01-22 DIAGNOSIS — N40.0 BENIGN PROSTATIC HYPERPLASIA WITHOUT LOWER URINARY TRACT SYMPTOMS: ICD-10-CM

## 2024-01-22 DIAGNOSIS — M48.56XA COLLAPSED VERTEBRA, NOT ELSEWHERE CLASSIFIED, LUMBAR REGION, INITIAL ENCOUNTER FOR FRACTURE: ICD-10-CM

## 2024-01-22 DIAGNOSIS — R07.9 CHEST PAIN, UNSPECIFIED: ICD-10-CM

## 2024-01-22 DIAGNOSIS — R19.7 DIARRHEA, UNSPECIFIED: ICD-10-CM

## 2024-04-27 NOTE — H&P ADULT - NSHPPHYSICALEXAM_GEN_ALL_CORE
PHYSICAL EXAMINATION:  GENERAL: NAD, Alert.  HEENT:  Normocephalic and atraumatic.  Extraocular muscles are intact.  NECK: Supple.  - JVD.  CARDIOVASCULAR: RRR S1, S2.  LUNGS: CTAB, - rales, - wheezing, - rhonchi.  BACK: - CVA tenderness.  Mild lower spinal tenderness.  ABDOMEN: Soft, - tenderness, - distension, + BS.  EXTREMITIES: - cyanosis, - clubbing, - edema.  NEUROLOGIC: strength is symmetric, sensation intact, speech fluent.  PSYCHIATRIC: Calm.  - agitation.    SKIN: - rashes, - lesions. on the discharge service for the patient. I have reviewed and made amendments to the documentation where necessary.